# Patient Record
Sex: MALE | Race: WHITE | Employment: OTHER | URBAN - METROPOLITAN AREA
[De-identification: names, ages, dates, MRNs, and addresses within clinical notes are randomized per-mention and may not be internally consistent; named-entity substitution may affect disease eponyms.]

---

## 2019-07-24 ENCOUNTER — APPOINTMENT (OUTPATIENT)
Dept: RADIOLOGY | Facility: CLINIC | Age: 64
End: 2019-07-24
Payer: COMMERCIAL

## 2019-07-24 ENCOUNTER — OFFICE VISIT (OUTPATIENT)
Dept: URGENT CARE | Facility: CLINIC | Age: 64
End: 2019-07-24
Payer: COMMERCIAL

## 2019-07-24 VITALS
BODY MASS INDEX: 20.99 KG/M2 | HEIGHT: 72 IN | WEIGHT: 155 LBS | HEART RATE: 62 BPM | OXYGEN SATURATION: 100 % | RESPIRATION RATE: 16 BRPM | SYSTOLIC BLOOD PRESSURE: 159 MMHG | DIASTOLIC BLOOD PRESSURE: 79 MMHG | TEMPERATURE: 97 F

## 2019-07-24 DIAGNOSIS — L08.9: ICD-10-CM

## 2019-07-24 DIAGNOSIS — L08.9: Primary | ICD-10-CM
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
## 2019-07-24 DIAGNOSIS — S90.464A: Primary | ICD-10-CM
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
## 2019-07-24 DIAGNOSIS — S90.464A: ICD-10-CM

## 2019-07-24 PROCEDURE — G0382 LEV 3 HOSP TYPE B ED VISIT: HCPCS | Performed by: PHYSICIAN ASSISTANT

## 2019-07-24 PROCEDURE — 73660 X-RAY EXAM OF TOE(S): CPT

## 2019-07-24 RX ORDER — METHYLPREDNISOLONE 4 MG/1
TABLET ORAL
Qty: 21 TABLET | Refills: 0 | Status: SHIPPED | OUTPATIENT
Start: 2019-07-24 | End: 2020-01-09 | Stop reason: ALTCHOICE

## 2019-07-24 RX ORDER — SULFAMETHOXAZOLE AND TRIMETHOPRIM 800; 160 MG/1; MG/1
1 TABLET ORAL EVERY 12 HOURS SCHEDULED
Qty: 14 TABLET | Refills: 0 | Status: SHIPPED | OUTPATIENT
Start: 2019-07-24 | End: 2019-07-31

## 2019-07-24 NOTE — PROGRESS NOTES
[address] [facility]    NAME: Georgia Og is a 61 y o  male  : 1955    MRN: [id number]  DATE: 2019  TIME: 6:39 PM    Assessment and Plan   Nonvenomous insect bite of toe with infection, right, initial encounter [S90 464A, L08 9, W57  XXXA]  1  Nonvenomous insect bite of toe with infection, right, initial encounter  XR toe right second min 2 views    sulfamethoxazole-trimethoprim (BACTRIM DS) 800-160 mg per tablet    methylprednisolone (MEDROL) 4 mg tablet       Patient Instructions     Patient Instructions   Keep wound clean and dry  Change dressing daily or any time that it is wet  Start antibiotic and Medrol Dosepak  Take as directed  If symptoms are worsening, you get a fever or redness is spreading, go to the emergency room  Follow up with family doctor in the next week to ensure proper healing  Chief Complaint     Chief Complaint   Patient presents with    Wound Infection     righr 2nd toe x 3 days       History of Present Illness   68-year-old male here with complaint of insect bite to his right 2nd toe  Patient states that he was walking through a field of [place] and got stung by a bee a few days ago  Area is getting more reddened and swollen  Patient states that he was wearing his boots to work find a blister developed over the swollen area  Blister has now opened and there is more redness around the area  He denies any fever or chills  Review of Systems   Review of Systems   Constitutional: Negative for chills and fever  Respiratory: Negative for cough and shortness of breath  Cardiovascular: Negative for chest pain  Skin: Positive for wound  Neurological: Negative for weakness and numbness  All other systems reviewed and are negative        Current Medications     Current Outpatient Medications:     methylprednisolone (MEDROL) 4 mg tablet, Medrol dosepak, take as directed, Disp: 21 tablet, Rfl: 0    sulfamethoxazole-trimethoprim (BACTRIM DS) 800-160 mg per tablet, Take 1 tablet by mouth every 12 (twelve) hours for 7 days, Disp: 14 tablet, Rfl: 0    Current Allergies     Allergies as of 07/24/2019 - Reviewed 07/24/2019   Allergen Reaction Noted    Penicillins Rash 11/30/2011          The following portions of the patient's history were reviewed and updated as appropriate: allergies, current medications, past family history, past medical history, past social history, past surgical history and problem list    History reviewed  No pertinent past medical history    Past Surgical History:   Procedure Laterality Date    EYE SURGERY       Family History   Problem Relation Age of Onset    Heart disease Mother     Cancer Mother     Colon cancer Paternal Grandfather     Diabetes Family      Social History     Socioeconomic History    Marital status: /Civil Union     Spouse name: Not on file    Number of children: Not on file    Years of education: Not on file    Highest education level: Not on file   Occupational History    Not on file   Social Needs    Financial resource strain: Not on file    Food insecurity:     Worry: Not on file     Inability: Not on file    Transportation needs:     Medical: Not on file     Non-medical: Not on file   Tobacco Use    Smoking status: Never Smoker   Substance and Sexual Activity    Alcohol use: Not on file    Drug use: Not on file    Sexual activity: Not on file   Lifestyle    Physical activity:     Days per week: Not on file     Minutes per session: Not on file    Stress: Not on file   Relationships    Social connections:     Talks on phone: Not on file     Gets together: Not on file     Attends Voodoo service: Not on file     Active member of club or organization: Not on file     Attends meetings of clubs or organizations: Not on file     Relationship status: Not on file    Intimate partner violence:     Fear of current or ex partner: Not on file     Emotionally abused: Not on file     Physically abused: Not on file     Forced sexual activity: Not on file   Other Topics Concern    Not on file   Social History Narrative    Denied: Alcohol use (history) - As per [employer]    Caffeine use    Uses safety equipment: Seatbelts      Medications have been verified  Objective   /79   Pulse 62   Temp (!) 97 °F (36 1 °C)   Resp 16   Ht 6' (1 829 m)   Wt 70 3 kg (155 lb)   SpO2 100%   BMI 21 02 kg/m²      Physical Exam   Physical Exam   Constitutional: He appears well-developed and well-nourished  No distress  HENT:   Head: Normocephalic and atraumatic  Cardiovascular: Normal rate and regular rhythm  No murmur heard  Pulmonary/Chest: Effort normal and breath sounds normal  No respiratory distress  Musculoskeletal:        Left foot: There is tenderness and swelling  There is normal range of motion and normal capillary refill (Cap refill less than 2 seconds  Sensation intact to light touch )  Feet:    Nursing note and vitals reviewed

## 2019-07-24 NOTE — PATIENT INSTRUCTIONS
Keep wound clean and dry  Change dressing daily or any time that it is wet  Start antibiotic and Medrol Dosepak  Take as directed  If symptoms are worsening, you get a fever or redness is spreading, go to the emergency room  Follow up with family doctor in the next week to ensure proper healing

## 2020-01-09 ENCOUNTER — OFFICE VISIT (OUTPATIENT)
Dept: URGENT CARE | Facility: CLINIC | Age: 65
End: 2020-01-09
Payer: COMMERCIAL

## 2020-01-09 VITALS
BODY MASS INDEX: 20.99 KG/M2 | HEIGHT: 72 IN | TEMPERATURE: 98.5 F | WEIGHT: 155 LBS | HEART RATE: 86 BPM | RESPIRATION RATE: 18 BRPM | SYSTOLIC BLOOD PRESSURE: 125 MMHG | OXYGEN SATURATION: 98 % | DIASTOLIC BLOOD PRESSURE: 79 MMHG

## 2020-01-09 DIAGNOSIS — J01.00 ACUTE NON-RECURRENT MAXILLARY SINUSITIS: Primary | ICD-10-CM

## 2020-01-09 PROCEDURE — 99213 OFFICE O/P EST LOW 20 MIN: CPT | Performed by: NURSE PRACTITIONER

## 2020-01-09 RX ORDER — DOXYCYCLINE 100 MG/1
100 TABLET ORAL 2 TIMES DAILY
Qty: 14 TABLET | Refills: 0 | Status: SHIPPED | OUTPATIENT
Start: 2020-01-09 | End: 2020-01-16

## 2020-01-09 RX ORDER — BENZONATATE 100 MG/1
100 CAPSULE ORAL 3 TIMES DAILY PRN
Qty: 20 CAPSULE | Refills: 0 | Status: SHIPPED | OUTPATIENT
Start: 2020-01-09 | End: 2020-03-07 | Stop reason: ALTCHOICE

## 2020-01-09 NOTE — PATIENT INSTRUCTIONS
Rest and drink extra fluids  Start antibiotic  Take probiotic  Flonase and allergy medicine can also be helpful  Over-the-counter cough and cold medicine as needed  Tylenol Motrin as needed for pain or fever  Follow up with PCP if no improvement  Go to the ER with any worsening symptoms, chest pain, shortness of breath or difficulty breathing

## 2020-01-09 NOTE — PROGRESS NOTES
[address] [facility]        NAME: Leighann Albarran is a 59 y o  male  : 1955    MRN: [id number]  DATE: 2020  TIME: 3:32 PM    Assessment and Plan   Acute non-recurrent maxillary sinusitis [J01 00]  1  Acute non-recurrent maxillary sinusitis  doxycycline (ADOXA) 100 MG tablet    benzonatate (TESSALON PERLES) 100 mg capsule         Patient Instructions     Patient Instructions   Rest and drink extra fluids  Start antibiotic  Take probiotic  Flonase and allergy medicine can also be helpful  Over-the-counter cough and cold medicine as needed  Tylenol Motrin as needed for pain or fever  Follow up with PCP if no improvement  Go to the ER with any worsening symptoms, chest pain, shortness of breath or difficulty breathing  Chief Complaint     Chief Complaint   Patient presents with    Cough     x 5 days    Cold Like Symptoms         History of Present Illness   Irena Wilder presents to the clinic c/o    This is a 58-year-old male here today with complaints of sinus pressure, nasal congestion and cough  He states symptoms started about 5-6 days ago  He states symptoms are getting worse  He denies any chest pain or shortness of breath  He does have some pain with coughing  He states he does not had fevers but has been having body aches and chills  Denies sore throat  He states he is having a lot of nasal drainage  He is having a lot of postnasal drip  He denies having influenza vaccine  Review of Systems   Review of Systems   Constitutional: Positive for activity change and fatigue  Negative for chills and fever  HENT: Positive for congestion, rhinorrhea, sinus pressure and sinus pain  Negative for sore throat  Respiratory: Positive for cough  Negative for chest tightness, shortness of breath and wheezing  Skin: Negative  Neurological: Negative  Psychiatric/Behavioral: Negative  Current Medications     No long-term medications on file         Current Allergies     Allergies as of 01/09/2020 - Reviewed 01/09/2020   Allergen Reaction Noted    Penicillins Rash 11/30/2011            The following portions of the patient's history were reviewed and updated as appropriate: allergies, current medications, past family history, past medical history, past social history, past surgical history and problem list     Objective   /79   Pulse 86   Temp 98 5 °F (36 9 °C)   Resp 18   Ht 6' (1 829 m)   Wt 70 3 kg (155 lb)   SpO2 98%   BMI 21 02 kg/m²        Physical Exam     Physical Exam   Constitutional: He is oriented to person, place, and time  HENT:   Right Ear: External ear normal    Left Ear: External ear normal    Sinus pressure   Neck: Normal range of motion  Neck supple  Cardiovascular: Normal rate and regular rhythm  Pulmonary/Chest: Effort normal and breath sounds normal    Neurological: He is alert and oriented to person, place, and time  Psychiatric: He has a normal mood and affect  His behavior is normal    Nursing note and vitals reviewed    The

## 2020-03-07 ENCOUNTER — OFFICE VISIT (OUTPATIENT)
Dept: URGENT CARE | Facility: CLINIC | Age: 65
End: 2020-03-07
Payer: COMMERCIAL

## 2020-03-07 VITALS
RESPIRATION RATE: 16 BRPM | BODY MASS INDEX: 20.99 KG/M2 | HEIGHT: 72 IN | WEIGHT: 155 LBS | OXYGEN SATURATION: 98 % | HEART RATE: 85 BPM | DIASTOLIC BLOOD PRESSURE: 86 MMHG | TEMPERATURE: 98 F | SYSTOLIC BLOOD PRESSURE: 146 MMHG

## 2020-03-07 DIAGNOSIS — S01.01XA LACERATION OF SCALP, INITIAL ENCOUNTER: Primary | ICD-10-CM

## 2020-03-07 PROCEDURE — G0382 LEV 3 HOSP TYPE B ED VISIT: HCPCS | Performed by: PHYSICIAN ASSISTANT

## 2020-03-07 NOTE — PATIENT INSTRUCTIONS
Hydration and rest  Tylenol and motrin for pain  Keep wound clean and dry  Follow up in 5-7 days for suture removal  Go to ER if HA, dizziness, vomiting, vision changes, weakness or numbness occurs  Staple Care   WHAT YOU NEED TO KNOW:   Staples are often used to close a wound  Your staples may be placed for 3 to 14 days, depending on the location of your wound  DISCHARGE INSTRUCTIONS:   Care for your wound:   · Clean:      ¨ You may be able to shower in 24 hours  Do not soak your wound under water  ¨ Gently wash your wound with soap and warm water daily  Lightly pat it dry  Do not cover your wound unless your healthcare provider tells you to  ¨ You may also need to clean your wound with a mixture of hydrogen peroxide and water  Ask how to do this  ¨ Do not apply ointment or cream to the wound unless your healthcare provider tells you to  · Elevate:      ¨ Rest any arm or leg that has a wound on pillows above the level of your heart  Do this as often as possible for 2 days  This will help decrease swelling and pain, and help you heal faster  · Minimize scarring:      ¨ Avoid sunshine on your wound to reduce scarring  Follow up with your healthcare provider as directed: You may need to return for a wound checkup 3 days after your staples are placed  Ask when you should return to get your staples removed  Staple removal:   · A medical staple remover  will be used to take out your staples  Your healthcare provider will slide the tool under each staple, squeeze the handle, and gently pull the staple out  · Medical tape  will be placed on your wound once your staples are removed  This will help keep your wound closed  The medical tape will fall off on its own after several days  Contact your healthcare provider if:   · You have redness, pain, swelling, and pus draining from your wound  · Your pain medicine does not relieve your pain      · You have a fever of 101°F (38 5°C) or higher  · You have an odor coming from your wound  · You have questions or concerns about your condition or care  Return to the emergency department if:   · Your wound reopens  · You have red streaks in your skin that spread out from your wound  · You have severe pain or vomiting  © 2017 [address] [employer] Information is for End User's use only and may not be sold, redistributed or otherwise used for commercial purposes  All illustrations and images included in CareNotes® are the copyrighted property of A D A M , [employer]  or [employer]  The above information is an  only  It is not intended as medical advice for individual conditions or treatments  Talk to your doctor, nurse or pharmacist before following any medical regimen to see if it is safe and effective for you

## 2020-03-07 NOTE — PROGRESS NOTES
[facility]        NAME: Asia Fitch is a 59 y o  male  : 1955    MRN: [id number]  DATE: 2020  TIME: 5:19 PM    Assessment and Plan   Laceration of scalp, initial encounter [S01 01XA]  1  Laceration of scalp, initial encounter           Patient Instructions     Patient Instructions   Hydration and rest  Tylenol and motrin for pain  Keep wound clean and dry  Follow up in 5-7 days for suture removal  Go to ER if HA, dizziness, vomiting, vision changes, weakness or numbness occurs  Staple Care   WHAT YOU NEED TO KNOW:   Staples are often used to close a wound  Your staples may be placed for 3 to 14 days, depending on the location of your wound  DISCHARGE INSTRUCTIONS:   Care for your wound:   · Clean:      ¨ You may be able to shower in 24 hours  Do not soak your wound under water  ¨ Gently wash your wound with soap and warm water daily  Lightly pat it dry  Do not cover your wound unless your healthcare provider tells you to  ¨ You may also need to clean your wound with a mixture of hydrogen peroxide and water  Ask how to do this  ¨ Do not apply ointment or cream to the wound unless your healthcare provider tells you to  · Elevate:      ¨ Rest any arm or leg that has a wound on pillows above the level of your heart  Do this as often as possible for 2 days  This will help decrease swelling and pain, and help you heal faster  · Minimize scarring:      ¨ Avoid sunshine on your wound to reduce scarring  Follow up with your healthcare provider as directed: You may need to return for a wound checkup 3 days after your staples are placed  Ask when you should return to get your staples removed  Staple removal:   · A medical staple remover  will be used to take out your staples  Your healthcare provider will slide the tool under each staple, squeeze the handle, and gently pull the staple out  · Medical tape  will be placed on your wound once your staples are removed  Musculoskeletal: Negative for gait problem, neck pain and neck stiffness  Skin: Positive for wound  Neurological: Negative for dizziness, syncope, speech difficulty, weakness, light-headedness, numbness and headaches  Current Medications     No current outpatient medications on file  Current Allergies     Allergies as of 03/07/2020 - Reviewed 03/07/2020   Allergen Reaction Noted    Penicillins Rash 11/30/2011            The following portions of the patient's history were reviewed and updated as appropriate: allergies, current medications, past family history, past medical history, past social history, past surgical history and problem list      History reviewed  No pertinent past medical history  Past Surgical History:   Procedure Laterality Date    EYE SURGERY         Family History   Problem Relation Age of Onset    Heart disease Mother     Cancer Mother     Colon cancer Paternal Grandfather     Diabetes Family          Medications have been verified  Objective   /86   Pulse 85   Temp 98 °F (36 7 °C)   Resp 16   Ht 6' (1 829 m)   Wt 70 3 kg (155 lb)   SpO2 98%   BMI 21 02 kg/m²          Physical Exam     Physical Exam   Constitutional: He is oriented to person, place, and time  He appears well-developed and well-nourished  No distress  HENT:   Head: Normocephalic  Eyes: Pupils are equal, round, and reactive to light  Conjunctivae and EOM are normal    Neck: Normal range of motion  Neck supple  Cardiovascular: Normal rate, regular rhythm and intact distal pulses  Pulmonary/Chest: Effort normal and breath sounds normal    Neurological: He is alert and oriented to person, place, and time  No cranial nerve deficit or sensory deficit  Coordination normal    Skin: Skin is warm and dry  2 cm linear laceration superior mid scalp  No foreign body, erythema, edema, discharge  No bone or muscle injury  Vitals reviewed        Laceration repair  Date/Time: 3/7/2020 5:18 PM  Performed by: Abdulkadir Mcmahan PA-C  Authorized by: Abdulkadir Mcmahan PA-C   Consent: Verbal consent obtained  Written consent obtained    Body area: head/neck  Location details: scalp  Laceration length: 2 cm  Foreign bodies: no foreign bodies  Tendon involvement: none  Nerve involvement: none      Procedure Details:  Irrigation solution: saline  Irrigation method: syringe  Amount of cleaning: standard  Skin closure: staples  Number of sutures: 4  Approximation: close  Approximation difficulty: simple  Dressing: antibiotic ointment and 4x4 sterile gauze

## 2021-09-14 ENCOUNTER — APPOINTMENT (OUTPATIENT)
Dept: RADIOLOGY | Facility: CLINIC | Age: 66
End: 2021-09-14
Payer: COMMERCIAL

## 2021-09-14 ENCOUNTER — OFFICE VISIT (OUTPATIENT)
Dept: URGENT CARE | Facility: CLINIC | Age: 66
End: 2021-09-14
Payer: COMMERCIAL

## 2021-09-14 VITALS
DIASTOLIC BLOOD PRESSURE: 72 MMHG | HEART RATE: 70 BPM | RESPIRATION RATE: 18 BRPM | TEMPERATURE: 98.1 F | OXYGEN SATURATION: 97 % | SYSTOLIC BLOOD PRESSURE: 161 MMHG

## 2021-09-14 DIAGNOSIS — M79.671 RIGHT FOOT PAIN: ICD-10-CM

## 2021-09-14 DIAGNOSIS — S92.351A CLOSED DISPLACED FRACTURE OF FIFTH METATARSAL BONE OF RIGHT FOOT, INITIAL ENCOUNTER: Primary | ICD-10-CM

## 2021-09-14 PROCEDURE — 99213 OFFICE O/P EST LOW 20 MIN: CPT | Performed by: PHYSICIAN ASSISTANT

## 2021-09-14 PROCEDURE — 73630 X-RAY EXAM OF FOOT: CPT

## 2021-09-14 PROCEDURE — 29515 APPLICATION SHORT LEG SPLINT: CPT | Performed by: PHYSICIAN ASSISTANT

## 2021-09-14 NOTE — PROGRESS NOTES
[address] [facility]          NAME: Selam Molina is a 77 y o  male  : 1955    MRN: [id number]  DATE: 2021  TIME: 7:00 PM    Assessment and Plan   Closed displaced fracture of fifth metatarsal bone of right foot, initial encounter [S92 351A]  1  Closed displaced fracture of fifth metatarsal bone of right foot, initial encounter  XR foot 3+ vw right    Crutches    Ambulatory referral to Orthopedic Surgery       Patient Instructions   Fracture of 5th metatarsal  Short leg splint applied and splint instructions given  Crutches and crutch training provided  To follow up with Ortho as scheduled at todays apt  OTC IBU Q 4-6 hours prn with food for pain  Ice and elevate as able  Patient agreeable to plan  To present to the ER if symptoms worsen  Chief Complaint     Chief Complaint   Patient presents with    Foot Pain     Right foot pain after twisting it  History of Present Illness   Rowena Wilder presents to the clinic c/o    Leg Pain   The incident occurred 3 to 5 days ago  The incident occurred at home  The injury mechanism was a direct blow (dropped something on right foot (unsure exactly what it was) also fell one day and twisted his foot in mulitple directions per patient)  The pain is present in the right foot  The quality of the pain is described as aching  The pain is moderate  The pain has been constant since onset  Associated symptoms include numbness  Pertinent negatives include no inability to bear weight, loss of motion, loss of sensation or muscle weakness  The symptoms are aggravated by movement, palpation and weight bearing  He has tried nothing for the symptoms  The treatment provided no relief  Review of Systems   Review of Systems   Constitutional: Negative for chills, diaphoresis, fatigue and fever  HENT: Negative for congestion, ear discharge, ear pain and facial swelling      Eyes: Negative for photophobia, pain, discharge, redness, itching and visual disturbance  Respiratory: Negative for apnea, cough, chest tightness, shortness of breath and wheezing  Cardiovascular: Negative for chest pain and palpitations  Gastrointestinal: Negative for abdominal pain  Musculoskeletal: Positive for arthralgias  Skin: Negative for color change, rash and wound  Neurological: Positive for numbness  Negative for dizziness and headaches  Hematological: Negative for adenopathy  Current Medications     No long-term medications on file  Current Allergies     Allergies as of 09/14/2021 - Reviewed 09/14/2021   Allergen Reaction Noted    Penicillins Rash 11/30/2011            The following portions of the patient's history were reviewed and updated as appropriate: allergies, current medications, past family history, past medical history, past social history, past surgical history and problem list   No past medical history on file  Past Surgical History:   Procedure Laterality Date    EYE SURGERY       Social History     Socioeconomic History    Marital status: /Civil Union     Spouse name: Not on file    Number of children: Not on file    Years of education: Not on file    Highest education level: Not on file   Occupational History    Not on file   Tobacco Use    Smoking status: Never Smoker   Substance and Sexual Activity    Alcohol use: Not on file    Drug use: Not on file    Sexual activity: Not on file   Other Topics Concern    Not on file   Social History Narrative    Denied: Alcohol use (history) - As per [employer]    Caffeine use    Uses safety equipment: Seatbelts      Social Determinants of Health     Financial Resource Strain:     Difficulty of Paying Living Expenses:    Food Insecurity:     Worried About Running Out of Food in the Last Year:     [address] in the Last Year:    Transportation Needs:     Lack of Transportation (Medical):      Lack of Transportation (Non-Medical):    Physical Activity:     Days of Exercise per Week:     Minutes of Exercise per Session:    Stress:     Feeling of Stress :    Social Connections:     Frequency of Communication with Friends and Family:     Frequency of Social Gatherings with Friends and Family:     Attends Islam Services:     Active Member of Clubs or Organizations:     Attends Club or Organization Meetings:     Marital Status:    Intimate Partner Violence:     Fear of Current or Ex-Partner:     Emotionally Abused:     Physically Abused:     Sexually Abused:        Objective   /72   Pulse 70   Temp 98 1 °F (36 7 °C)   Resp 18   SpO2 97%      Physical Exam     Physical Exam  Vitals and nursing note reviewed  Constitutional:       General: He is not in acute distress  Appearance: He is well-developed  He is not diaphoretic  HENT:      Head: Normocephalic and atraumatic  Right Ear: External ear normal       Left Ear: External ear normal       Nose: Nose normal    Eyes:      General: No scleral icterus  Right eye: No discharge  Left eye: No discharge  Conjunctiva/sclera: Conjunctivae normal    Cardiovascular:      Rate and Rhythm: Normal rate and regular rhythm  Heart sounds: Normal heart sounds  No murmur heard  No friction rub  No gallop  Pulmonary:      Effort: Pulmonary effort is normal  No respiratory distress  Breath sounds: Normal breath sounds  No decreased breath sounds, wheezing, rhonchi or rales  Musculoskeletal:      Right ankle: Swelling present  No tenderness  Normal range of motion  Right foot: Normal capillary refill (dorsalis pedis pulse 2+)  Swelling, tenderness and bony tenderness (5th metatarsal) present  Normal pulse  Skin:     General: Skin is warm and dry  Coloration: Skin is not pale  Findings: No erythema or rash  Neurological:      Mental Status: He is alert and oriented to person, place, and time     Psychiatric:         Behavior: Behavior normal          Thought Content: Thought content normal          Judgment: Judgment normal      Splint application    Date/Time: 9/14/2021 6:58 PM  Performed by: Nilesh Blanca PA-C  Authorized by: Nilesh Blanca PA-C   Universal Protocol:  Consent: Verbal consent obtained  Risks and benefits: risks, benefits and alternatives were discussed  Consent given by: patient      Pre-procedure details:     Sensation:  Normal  Procedure details:     Laterality:  Right    Location:  Foot    Foot:  R foot    Strapping: no      Splint type:  Short leg    Supplies:  Ortho-Glass and skin protective strip  Post-procedure details:     Pain:  Improved    Sensation:  Normal    Patient tolerance of procedure:   Tolerated well, no immediate complications  Comments:      Crutch training        Nilesh Blanca PA-C

## 2021-09-16 ENCOUNTER — OFFICE VISIT (OUTPATIENT)
Dept: OBGYN CLINIC | Facility: CLINIC | Age: 66
End: 2021-09-16

## 2021-09-16 ENCOUNTER — APPOINTMENT (OUTPATIENT)
Dept: RADIOLOGY | Facility: CLINIC | Age: 66
End: 2021-09-16

## 2021-09-16 VITALS
HEIGHT: 72 IN | DIASTOLIC BLOOD PRESSURE: 71 MMHG | SYSTOLIC BLOOD PRESSURE: 173 MMHG | BODY MASS INDEX: 21.02 KG/M2 | HEART RATE: 83 BPM

## 2021-09-16 DIAGNOSIS — S99.911A ANKLE INJURY, RIGHT, INITIAL ENCOUNTER: ICD-10-CM

## 2021-09-16 DIAGNOSIS — S99.911A ANKLE INJURY, RIGHT, INITIAL ENCOUNTER: Primary | ICD-10-CM

## 2021-09-16 PROCEDURE — 99204 OFFICE O/P NEW MOD 45 MIN: CPT | Performed by: FAMILY MEDICINE

## 2021-09-16 PROCEDURE — 73610 X-RAY EXAM OF ANKLE: CPT

## 2021-09-16 NOTE — PROGRESS NOTES
[facility] [place] [id number]  [facility] [id number] [phone]      Chief Complaint:  Chief Complaint   Patient presents with    Right Foot - Pain       Vitals:  BP (!) 173/71   Pulse 83   Ht 6' (1 829 m)   BMI 21 02 kg/m²     The following portions of the patient's history were reviewed and updated as appropriate: allergies, current medications, past family history, past medical history, past social history, past surgical history, and problem list       Subjective:   Patient ID: Yamini Lugo is a 77 y o  male  Here c/o R foot pain/fracture  About 1-2 wks ago dropped something on his R foot  Saturday twisted his R foot  His foot was asleep and he tried to run across the room and twisted his R foot  Seen in UC 2 days ago  Given crutches and splint  Hurts to walk  Swollen  Sharp pain  No pain meds  RIGHT FOOT     INDICATION:   [id number]: Pain in right foot      COMPARISON:  07/24/2019     VIEWS:  XR FOOT 3+ VW RIGHT   Images: 3     FINDINGS:     There is an acute displaced oblique fracture in the mid to distal 5th metatarsal      No significant degenerative changes      No lytic or blastic osseous lesion      Soft tissue swelling is present      IMPRESSION:     Acute slightly displaced fracture in the 5th metatarsal             Review of Systems   Constitutional: Negative for fatigue and fever  Respiratory: Negative for shortness of breath  Cardiovascular: Negative for chest pain  Gastrointestinal: Negative for abdominal pain and nausea  Genitourinary: Negative for dysuria  Musculoskeletal: Positive for arthralgias and gait problem  Skin: Negative for rash and wound  Neurological: Negative for weakness and headaches  Objective:  Right Ankle Exam     Tenderness   The patient is experiencing tenderness in the ATF and lateral malleolus (5th metatarsal)    Swelling: moderate    Range of Motion   The patient has normal right ankle ROM  Other   Sensation: normal             Physical Exam  Vitals and nursing note reviewed  Constitutional:       Appearance: Normal appearance  He is well-developed  HENT:      Head: Normocephalic  Mouth/Throat:      Mouth: Mucous membranes are moist    Eyes:      Extraocular Movements: Extraocular movements intact  Cardiovascular:      Rate and Rhythm: Normal rate and regular rhythm  Heart sounds: Normal heart sounds  Pulmonary:      Effort: Pulmonary effort is normal       Breath sounds: Normal breath sounds  Abdominal:      General: Bowel sounds are normal       Palpations: Abdomen is soft  Musculoskeletal:         General: Swelling, tenderness and signs of injury present  Normal range of motion  Cervical back: Normal range of motion  Skin:     General: Skin is warm and dry  Neurological:      General: No focal deficit present  Mental Status: He is alert and oriented to person, place, and time  Psychiatric:         Mood and Affect: Mood normal          Behavior: Behavior normal          Thought Content: Thought content normal          I have personally reviewed pertinent films in PACS and my interpretation is XR- R foot- displaced fx of the 5th metatarsal shaft  XR- R ankle- no fx  Assessment/Plan:  Assessment/Plan   Diagnoses and all orders for this visit:    Ankle injury, right, initial encounter  -     XR ankle 3+ vw right; Future  -     Cam Boot        Return in about 3 weeks (around 10/7/2021) for Recheck       Mainor Sarkar MD

## 2021-09-16 NOTE — PATIENT INSTRUCTIONS
F/u 3 wks  XR 6 wks and 14 wks and 24 months if necessary  Consider surgery if having pain after 14 wks  Begin wearing boot  Boot for 6 wks  Weight bearing as tolerated  Crutches as needed  Icing/elevation/OTC pain meds as needed

## 2021-10-07 ENCOUNTER — OFFICE VISIT (OUTPATIENT)
Dept: OBGYN CLINIC | Facility: CLINIC | Age: 66
End: 2021-10-07

## 2021-10-07 VITALS
SYSTOLIC BLOOD PRESSURE: 151 MMHG | HEIGHT: 72 IN | DIASTOLIC BLOOD PRESSURE: 61 MMHG | BODY MASS INDEX: 21.02 KG/M2 | HEART RATE: 68 BPM

## 2021-10-07 DIAGNOSIS — S92.351D CLOSED DISPLACED FRACTURE OF FIFTH METATARSAL BONE OF RIGHT FOOT WITH ROUTINE HEALING, SUBSEQUENT ENCOUNTER: Primary | ICD-10-CM

## 2021-10-07 PROCEDURE — 99213 OFFICE O/P EST LOW 20 MIN: CPT | Performed by: FAMILY MEDICINE

## 2021-10-23 ENCOUNTER — OFFICE VISIT (OUTPATIENT)
Dept: URGENT CARE | Facility: CLINIC | Age: 66
End: 2021-10-23
Payer: COMMERCIAL

## 2021-10-23 ENCOUNTER — APPOINTMENT (OUTPATIENT)
Dept: RADIOLOGY | Facility: CLINIC | Age: 66
End: 2021-10-23
Payer: COMMERCIAL

## 2021-10-23 VITALS
OXYGEN SATURATION: 98 % | WEIGHT: 155 LBS | HEART RATE: 66 BPM | SYSTOLIC BLOOD PRESSURE: 144 MMHG | TEMPERATURE: 98 F | RESPIRATION RATE: 20 BRPM | BODY MASS INDEX: 20.99 KG/M2 | DIASTOLIC BLOOD PRESSURE: 68 MMHG | HEIGHT: 72 IN

## 2021-10-23 DIAGNOSIS — M79.672 LEFT FOOT PAIN: ICD-10-CM

## 2021-10-23 DIAGNOSIS — M79.622 ARMPIT PAIN, LEFT: ICD-10-CM

## 2021-10-23 DIAGNOSIS — L03.115 CELLULITIS OF RIGHT FOOT: ICD-10-CM

## 2021-10-23 DIAGNOSIS — L89.892 PRESSURE ULCER OF TOE OF RIGHT FOOT, STAGE 2 (HCC): Primary | ICD-10-CM

## 2021-10-23 PROCEDURE — 73630 X-RAY EXAM OF FOOT: CPT

## 2021-10-23 PROCEDURE — 99214 OFFICE O/P EST MOD 30 MIN: CPT | Performed by: NURSE PRACTITIONER

## 2021-10-23 RX ORDER — CEPHALEXIN 500 MG/1
500 CAPSULE ORAL 4 TIMES DAILY
Qty: 40 CAPSULE | Refills: 0 | Status: SHIPPED | OUTPATIENT
Start: 2021-10-23 | End: 2021-11-02

## 2021-11-01 ENCOUNTER — OFFICE VISIT (OUTPATIENT)
Dept: OBGYN CLINIC | Facility: CLINIC | Age: 66
End: 2021-11-01

## 2021-11-01 ENCOUNTER — APPOINTMENT (OUTPATIENT)
Dept: RADIOLOGY | Facility: CLINIC | Age: 66
End: 2021-11-01

## 2021-11-01 VITALS
BODY MASS INDEX: 22.43 KG/M2 | SYSTOLIC BLOOD PRESSURE: 156 MMHG | DIASTOLIC BLOOD PRESSURE: 82 MMHG | WEIGHT: 165.4 LBS | TEMPERATURE: 97.6 F | HEART RATE: 72 BPM

## 2021-11-01 DIAGNOSIS — S92.351D CLOSED DISPLACED FRACTURE OF FIFTH METATARSAL BONE OF RIGHT FOOT WITH ROUTINE HEALING, SUBSEQUENT ENCOUNTER: ICD-10-CM

## 2021-11-01 DIAGNOSIS — S92.351D CLOSED DISPLACED FRACTURE OF FIFTH METATARSAL BONE OF RIGHT FOOT WITH ROUTINE HEALING, SUBSEQUENT ENCOUNTER: Primary | ICD-10-CM

## 2021-11-01 PROCEDURE — 99214 OFFICE O/P EST MOD 30 MIN: CPT | Performed by: FAMILY MEDICINE

## 2021-11-01 PROCEDURE — 73630 X-RAY EXAM OF FOOT: CPT

## 2021-12-11 ENCOUNTER — TELEPHONE (OUTPATIENT)
Dept: OBGYN CLINIC | Facility: CLINIC | Age: 66
End: 2021-12-11

## 2022-01-18 ENCOUNTER — OFFICE VISIT (OUTPATIENT)
Dept: URGENT CARE | Facility: CLINIC | Age: 67
End: 2022-01-18
Payer: COMMERCIAL

## 2022-01-18 VITALS
DIASTOLIC BLOOD PRESSURE: 80 MMHG | RESPIRATION RATE: 16 BRPM | TEMPERATURE: 97.7 F | WEIGHT: 161.25 LBS | HEART RATE: 86 BPM | SYSTOLIC BLOOD PRESSURE: 130 MMHG | OXYGEN SATURATION: 97 % | BODY MASS INDEX: 21.87 KG/M2

## 2022-01-18 DIAGNOSIS — L03.90 CELLULITIS, UNSPECIFIED CELLULITIS SITE: Primary | ICD-10-CM

## 2022-01-18 DIAGNOSIS — B35.3 TINEA PEDIS OF RIGHT FOOT: ICD-10-CM

## 2022-01-18 PROCEDURE — 99213 OFFICE O/P EST LOW 20 MIN: CPT | Performed by: PHYSICIAN ASSISTANT

## 2022-01-18 RX ORDER — CEPHALEXIN 500 MG/1
500 CAPSULE ORAL EVERY 8 HOURS SCHEDULED
Qty: 30 CAPSULE | Refills: 0 | Status: SHIPPED | OUTPATIENT
Start: 2022-01-18 | End: 2022-01-28

## 2022-01-18 RX ORDER — CLOTRIMAZOLE 1 %
CREAM (GRAM) TOPICAL 2 TIMES DAILY
Qty: 30 G | Refills: 0 | Status: SHIPPED | OUTPATIENT
Start: 2022-01-18

## 2022-01-18 NOTE — PATIENT INSTRUCTIONS
Cellulitis   WHAT YOU NEED TO KNOW:   Cellulitis is a skin infection caused by bacteria  Cellulitis is common and can become severe  Cellulitis usually appears on the lower legs  It can also appear on the arms, face, and other areas  Cellulitis develops when bacteria enter a crack or break in your skin, such as a scratch, bite, or cut  DISCHARGE INSTRUCTIONS:   Return to the emergency department if:   · Your wound gets larger and more painful  · You feel a crackling under your skin when you touch it  · You have purple dots or bumps on your skin, or you see bleeding under your skin  · You see red streaks coming from the infected area  Call your doctor if:   · The red, warm, swollen area gets larger  · Your fever or pain does not go away or gets worse  · The area does not get smaller after 3 days of antibiotics  · You have questions or concerns about your condition or care  Medicines: You should start to see improvement in 3 days  If cellulitis is not treated, the infection can spread through your body and become life-threatening  You may need any of the following medicines:  · Antibiotics  help treat the bacterial infection  · Acetaminophen  decreases pain and fever  It is available without a doctor's order  Ask how much to take and how often to take it  Follow directions  Read the labels of all other medicines you are using to see if they also contain acetaminophen, or ask your doctor or pharmacist  Acetaminophen can cause liver damage if not taken correctly  Do not use more than 4 grams (4,000 milligrams) total of acetaminophen in one day  · NSAIDs , such as ibuprofen, help decrease swelling, pain, and fever  This medicine is available with or without a doctor's order  NSAIDs can cause stomach bleeding or kidney problems in certain people  If you take blood thinner medicine, always ask your healthcare provider if NSAIDs are safe for you   Always read the medicine label and follow directions  · Take your medicine as directed  Contact your healthcare provider if you think your medicine is not helping or if you have side effects  Tell him or her if you are allergic to any medicine  Keep a list of the medicines, vitamins, and herbs you take  Include the amounts, and when and why you take them  Bring the list or the pill bottles to follow-up visits  Carry your medicine list with you in case of an emergency  Self-care:   · Wash the area with soap and water every day  Gently pat dry  Use bandages if directed by your healthcare provider  · Elevate the area above the level of your heart  as often as you can  This will help decrease swelling and pain  Prop the area on pillows or blankets to keep it elevated comfortably  · Place a cool, damp cloth on the area  Use clean cloths and clean water  You can do this as often as you need to  Cool, damp cloths may help decrease pain  · Apply cream or ointment as directed  These help protect the area  Most over-the-counter products, such as petroleum jelly, are good to use  Ask your healthcare provider about specific creams or ointments you should use  Prevent cellulitis:   · Do not scratch bug bites or areas of injury  You increase your risk for cellulitis by scratching these areas  · Do not share personal items, such as towels, clothing, and razors  · Clean exercise equipment  with germ-killing  before and after you use it  · Treat athlete's foot  This can help prevent the spread of a bacterial skin infection  · Wash your hands often  Use soap and water  Wash your hands after you use the bathroom, change a child's diapers, or sneeze  Wash your hands before you prepare or eat food  Use lotion to prevent dry, cracked skin  Follow up with your doctor within 3 days, or as directed:  He or she will check if your cellulitis is getting better   Write down your questions so you remember to ask them during your visits  © Copyright [employer] 2021 Information is for End User's use only and may not be sold, redistributed or otherwise used for commercial purposes  All illustrations and images included in CareNotes® are the copyrighted property of [employer]  or Barbara Argueta  The above information is an  only  It is not intended as medical advice for individual conditions or treatments  Talk to your doctor, nurse or pharmacist before following any medical regimen to see if it is safe and effective for you  Tinea Pedis   WHAT YOU NEED TO KNOW:   Tinea pedis, or athlete's foot, is a foot infection caused by a fungus  DISCHARGE INSTRUCTIONS:   Medicines:   · Antifungal medicine: This medicine may be given as a cream, gel, or pill  You may need a doctor's order for this medicine  Take it until it is gone, even if your feet look like they are healed  · Take your medicine as directed  Call your healthcare provider if you think your medicine is not helping or if you have side effects  Tell him if you are allergic to any medicine  Keep a list of the medicines, vitamins, and herbs you take  Include the amounts, and when and why you take them  Bring the list or the pill bottles to follow-up visits  Carry your medicine list with you in case of an emergency  Follow up with your healthcare provider as directed:  Write down your questions so you remember to ask them during your visits  Prevent the spread of tinea pedis:   · Keep your feet clean and dry:  Wash your feet daily and dry your feet well, especially between your toes  After your feet are dry, use powder on your feet and between your toes  Wear clean cotton or wool socks each day  Put your socks on first so you do not spread the infection to other areas of your body  Wear sandals, canvas tennis shoes, or other shoes that allow air to flow to your feet  This helps keep your feet dry  Avoid plastic or rubber shoes       · Soak your feet: If you have blisters, soak your feet in an astringent (drying) solution  Do this for 20 to 30 minutes, 2 times each day to help dry out the blisters  An astringent solution may be bought at drug or grocery stores  · Wear shoes in public areas:  Do not walk barefoot in public places  Wear shower shoes or sandals in warm, damp areas  This includes shower stalls, near swimming pools, and locker rooms  Do not share socks or shoes  Contact your healthcare provider if:   · Your infection spreads or you have a rash on other parts of your body  · Your infection is not better in 14 days or completely gone in 90 days  · The skin on your foot or leg is red and hot  · You have an upset stomach or are dizzy  · You have questions or concerns about your condition or care  Seek care immediately if:   · You have a fever or chills  · You have red streaks going up your leg  © 2016 [address] is for End User's use only and may not be sold, redistributed or otherwise used for commercial purposes  All illustrations and images included in CareNotes® are the copyrighted property of [employer]  or [employer]  The above information is an  only  It is not intended as medical advice for individual conditions or treatments  Talk to your doctor, nurse or pharmacist before following any medical regimen to see if it is safe and effective for you

## 2022-01-18 NOTE — PROGRESS NOTES
[facility]        NAME: Sonal Gerardo is a 77 y o  male  : 1955    MRN: [id number]  DATE: 2022  TIME: 10:33 AM    /80   Pulse 86   Temp 97 7 °F (36 5 °C)   Resp 16   Wt 73 1 kg (161 lb 4 oz)   SpO2 97%   BMI 21 87 kg/m²     Assessment and Plan   Cellulitis, unspecified cellulitis site [L03 90]  1  Cellulitis, unspecified cellulitis site  clotrimazole (LOTRIMIN) 1 % cream    cephalexin (KEFLEX) 500 mg capsule   2  Tinea pedis of right foot           Patient Instructions       Follow up with PCP in 3-5 days  Proceed to  ER if symptoms worsen  Chief Complaint     Chief Complaint   Patient presents with    Foot Problem     possible infection, right foot between 4th and 5th toe  Noted some discoloration and pain last night  Itching x 2 days  Denies fever  History of Present Illness       Pt with several days of right foot itching and painful rash and swelling to 4ths and 5th toes, no known recent trauma , pt with known right foot fx      Review of Systems   Review of Systems   Constitutional: Negative  HENT: Negative  Eyes: Negative  Respiratory: Negative  Cardiovascular: Negative  Gastrointestinal: Negative  Endocrine: Negative  Genitourinary: Negative  Musculoskeletal: Negative  Skin: Negative  Allergic/Immunologic: Negative  Neurological: Negative  Hematological: Negative  Psychiatric/Behavioral: Negative  All other systems reviewed and are negative          Current Medications       Current Outpatient Medications:     cephalexin (KEFLEX) 500 mg capsule, Take 1 capsule (500 mg total) by mouth every 8 (eight) hours for 10 days, Disp: 30 capsule, Rfl: 0    clotrimazole (LOTRIMIN) 1 % cream, Apply topically 2 (two) times a day, Disp: 30 g, Rfl: 0    Current Allergies     Allergies as of 2022 - Reviewed 2022   Allergen Reaction Noted    Penicillins Rash 2011            The following portions of the patient's history were reviewed and updated as appropriate: allergies, current medications, past family history, past medical history, past social history, past surgical history and problem list      History reviewed  No pertinent past medical history  Past Surgical History:   Procedure Laterality Date    EYE SURGERY         Family History   Problem Relation Age of Onset    Heart disease Mother     Cancer Mother     Colon cancer Paternal Grandfather     Diabetes Family          Medications have been verified  Objective   /80   Pulse 86   Temp 97 7 °F (36 5 °C)   Resp 16   Wt 73 1 kg (161 lb 4 oz)   SpO2 97%   BMI 21 87 kg/m²        Physical Exam     Physical Exam  Vitals and nursing note reviewed  Constitutional:       Appearance: Normal appearance  He is normal weight  HENT:      Head: Normocephalic and atraumatic  Cardiovascular:      Rate and Rhythm: Normal rate and regular rhythm  Pulses: Normal pulses  Heart sounds: Normal heart sounds  Pulmonary:      Effort: Pulmonary effort is normal       Breath sounds: Normal breath sounds  Abdominal:      General: Abdomen is flat  Bowel sounds are normal       Palpations: Abdomen is soft  Musculoskeletal:      Cervical back: Normal range of motion and neck supple  Comments: Right 4th and 5th toes slight erythema and tender  Web space with tinea pedis ,  Toes with slight erythema    Skin:     General: Skin is warm  Capillary Refill: Capillary refill takes less than 2 seconds  Neurological:      General: No focal deficit present  Mental Status: He is alert and oriented to person, place, and time  Mental status is at baseline     Psychiatric:         Mood and Affect: Mood normal          Behavior: Behavior normal

## 2023-09-26 ENCOUNTER — OFFICE VISIT (OUTPATIENT)
Dept: URGENT CARE | Facility: CLINIC | Age: 68
End: 2023-09-26
Payer: MEDICARE

## 2023-09-26 ENCOUNTER — APPOINTMENT (OUTPATIENT)
Dept: RADIOLOGY | Facility: CLINIC | Age: 68
End: 2023-09-26
Payer: MEDICARE

## 2023-09-26 VITALS
DIASTOLIC BLOOD PRESSURE: 74 MMHG | HEIGHT: 72 IN | RESPIRATION RATE: 18 BRPM | SYSTOLIC BLOOD PRESSURE: 150 MMHG | HEART RATE: 74 BPM | WEIGHT: 163 LBS | TEMPERATURE: 97.8 F | BODY MASS INDEX: 22.08 KG/M2 | OXYGEN SATURATION: 98 %

## 2023-09-26 DIAGNOSIS — M79.672 PAIN OF LEFT HEEL: ICD-10-CM

## 2023-09-26 DIAGNOSIS — J06.9 VIRAL URI: Primary | ICD-10-CM

## 2023-09-26 LAB
SARS-COV-2 AG UPPER RESP QL IA: NEGATIVE
VALID CONTROL: NORMAL

## 2023-09-26 PROCEDURE — 73650 X-RAY EXAM OF HEEL: CPT

## 2023-09-26 PROCEDURE — 99213 OFFICE O/P EST LOW 20 MIN: CPT | Performed by: PHYSICIAN ASSISTANT

## 2023-09-26 PROCEDURE — 87811 SARS-COV-2 COVID19 W/OPTIC: CPT | Performed by: PHYSICIAN ASSISTANT

## 2023-09-26 PROCEDURE — G0463 HOSPITAL OUTPT CLINIC VISIT: HCPCS | Performed by: PHYSICIAN ASSISTANT

## 2023-09-26 NOTE — PATIENT INSTRUCTIONS
Infection appears viral.  Recommend symptomatic treatment. Can take ibuprofen or tylenol as needed for pain or fever. Over the counter cough and cold medications to help with symptoms. Use salt water gargles for sore throat and throat lozenges. Cough drops as needed. Wash hands frequently to prevent the spread of infection. If not improving over the next 7-10 days, follow up with PCP. Symptoms may persist for 10-14 days.     Follow up with podiatry for heel pain

## 2023-09-26 NOTE — PROGRESS NOTES
[facility]    NAME: Shantell Galloway is a 76 y.o. male  : 1955    MRN: [id number]  DATE: 2023  TIME: 4:47 PM    Assessment and Plan   Viral URI [J06.9]  1. Viral URI  Poct Covid 19 Rapid Antigen Test      2. Pain of left heel  XR heel / calcaneus 2+ vw left    Ambulatory Referral to Podiatry          Patient Instructions   Patient Instructions   Infection appears viral.  Recommend symptomatic treatment. Can take ibuprofen or tylenol as needed for pain or fever. Over the counter cough and cold medications to help with symptoms. Use salt water gargles for sore throat and throat lozenges. Cough drops as needed. Wash hands frequently to prevent the spread of infection. If not improving over the next 7-10 days, follow up with PCP. Symptoms may persist for 10-14 days. Follow up with podiatry for heel pain        Chief Complaint     Chief Complaint   Patient presents with   • Sore Throat     Worse at night and morning coughing. Started 4-5 days ago. History of Present Illness   76 male with complaint of cold symptoms, rhinorrhea, nasal congestion and sore throat for the last 4 days. No fever or chills. No body aches. He is also reporting left heel pain. He is unsure if he stepped on something and there is something in his heel. He does not notice any redness or any sores. No swelling. No injury. Review of Systems   Review of Systems   Constitutional: Positive for fatigue. Negative for chills and fever. HENT: Positive for congestion, rhinorrhea and sore throat. Negative for ear pain, postnasal drip and sinus pressure. Respiratory: Negative for cough, shortness of breath and wheezing. Neurological: Negative for headaches. All other systems reviewed and are negative.       Current Medications     Current Outpatient Medications:   •  clotrimazole (LOTRIMIN) 1 % cream, Apply topically 2 (two) times a day (Patient not taking: Reported on 2023), Disp: 30 g, Rfl: 0    Current Allergies     Allergies as of 09/26/2023 - Reviewed 09/26/2023   Allergen Reaction Noted   • Penicillins Rash 11/30/2011          The following portions of the patient's history were reviewed and updated as appropriate: allergies, current medications, past family history, past medical history, past social history, past surgical history and problem list.   No past medical history on file. Past Surgical History:   Procedure Laterality Date   • EYE SURGERY       Family History   Problem Relation Age of Onset   • Heart disease Mother    • Cancer Mother    • Colon cancer Paternal Grandfather    • Diabetes Family      Social History     Socioeconomic History   • Marital status: /Civil Union     Spouse name: Not on file   • Number of children: Not on file   • Years of education: Not on file   • Highest education level: Not on file   Occupational History   • Not on file   Tobacco Use   • Smoking status: Never   • Smokeless tobacco: Never   Vaping Use   • Vaping Use: Never used   Substance and Sexual Activity   • Alcohol use: Not Currently   • Drug use: Not Currently   • Sexual activity: Not on file   Other Topics Concern   • Not on file   Social History Narrative    Denied: Alcohol use (history) - As per [employer]    Caffeine use    Uses safety equipment: Seatbelts      Social Determinants of Health     Financial Resource Strain: Not on file   Food Insecurity: Not on file   Transportation Needs: Not on file   Physical Activity: Not on file   Stress: Not on file   Social Connections: Not on file   Intimate Partner Violence: Not on file   Housing Stability: Not on file     Medications have been verified. Objective   /74   Pulse 74   Temp 97.8 °F (36.6 °C)   Resp 18   Ht 6' (1.829 m)   Wt 73.9 kg (163 lb)   SpO2 98%   BMI 22.11 kg/m²      Physical Exam   Physical Exam  Vitals and nursing note reviewed. Constitutional:       General: He is not in acute distress.      Appearance: He is well-developed. HENT:      Head: Normocephalic and atraumatic. Right Ear: Tympanic membrane normal.      Left Ear: Tympanic membrane normal.      Nose: Rhinorrhea present. No mucosal edema. Mouth/Throat:      Mouth: Mucous membranes are moist.      Pharynx: Posterior oropharyngeal erythema present. Cardiovascular:      Rate and Rhythm: Normal rate and regular rhythm. Heart sounds: Normal heart sounds. Pulmonary:      Effort: Pulmonary effort is normal. No respiratory distress. Breath sounds: Normal breath sounds.

## 2023-10-04 ENCOUNTER — OFFICE VISIT (OUTPATIENT)
Dept: PODIATRY | Facility: CLINIC | Age: 68
End: 2023-10-04
Payer: MEDICARE

## 2023-10-04 ENCOUNTER — APPOINTMENT (OUTPATIENT)
Dept: RADIOLOGY | Facility: CLINIC | Age: 68
End: 2023-10-04
Payer: MEDICARE

## 2023-10-04 VITALS — WEIGHT: 163 LBS | HEIGHT: 72 IN | BODY MASS INDEX: 22.08 KG/M2

## 2023-10-04 DIAGNOSIS — M79.672 PAIN OF LEFT HEEL: ICD-10-CM

## 2023-10-04 DIAGNOSIS — M72.2 PLANTAR FASCIITIS OF LEFT FOOT: Primary | ICD-10-CM

## 2023-10-04 DIAGNOSIS — M76.62 ACHILLES TENDINITIS OF LEFT LOWER EXTREMITY: ICD-10-CM

## 2023-10-04 DIAGNOSIS — M24.572 EQUINUS CONTRACTURE OF LEFT ANKLE: ICD-10-CM

## 2023-10-04 PROCEDURE — 73630 X-RAY EXAM OF FOOT: CPT

## 2023-10-04 PROCEDURE — 99203 OFFICE O/P NEW LOW 30 MIN: CPT | Performed by: PODIATRIST

## 2023-10-04 NOTE — PATIENT INSTRUCTIONS
[employer]: [employer] Professional-Grade High Arch Support Orthotic Shoe Inserts for Maximum Support Mariusze, Green, 13.5-15 Men : Health & Household     [employer]    Achilles Eccentric Calf stretching    Vionic Shoes: Comfortable Stylish Shoes, Sandals, Boots & More       Plantar Fasciitis   WHAT YOU NEED TO KNOW:   Plantar fasciitis is swelling of the plantar fascia. The plantar fascia is a thick band of tissue that connects your heel bone to your toes. This part of your foot helps support the arch of your foot and absorbs shock. DISCHARGE INSTRUCTIONS:   Call your doctor if:   Your pain or swelling suddenly increases. You develop knee, hip, or back pain. You have questions or concerns about your condition or care. Medicines: You may  need any of the following:  Acetaminophen  decreases pain and fever. It is available without a doctor's order. Ask how much to take and how often to take it. Follow directions. Read the labels of all other medicines you are using to see if they also contain acetaminophen, or ask your doctor or pharmacist. Acetaminophen can cause liver damage if not taken correctly. NSAIDs , such as ibuprofen, help decrease swelling, pain, and fever. NSAIDs can cause stomach bleeding or kidney problems in certain people. If you take blood thinner medicine, always ask your healthcare provider if NSAIDs are safe for you. Always read the medicine label and follow directions. Take your medicine as directed. Contact your healthcare provider if you think your medicine is not helping or if you have side effects. Tell your provider if you are allergic to any medicine. Keep a list of the medicines, vitamins, and herbs you take. Include the amounts, and when and why you take them. Bring the list or the pill bottles to follow-up visits. Carry your medicine list with you in case of an emergency. Self-care:   Wear your splint or shoe inserts as directed.   You may need to wear a splint at night to keep your foot stretched while you sleep. This will help prevent sharp pain first thing in the morning. Shoe inserts will help decrease stress on your plantar fascia when you walk or exercise. Apply ice on your plantar fascia. Ice helps decrease swelling and pain. Fill a water bottle with water and freeze it. Wrap a towel around the bottle or cover it with a pillow case. Roll the water bottle under your foot for 10 minutes each morning and evening. Massage your plantar fascia as directed. This may help decrease swelling and pain. Roll a golf ball under your foot for 10 minutes. Repeat 3 times each day. Go to physical therapy as directed. A physical therapist teaches you exercises to help improve movement and strength, and to decrease pain. Prevent plantar fasciitis:   Maintain a healthy weight. This will help decrease stress on your feet. Ask your healthcare provider what a healthy weight is for you. Ask him or her to help you create a weight loss plan, if needed. Do low-impact exercises. Low-impact exercises decrease stress on your plantar fascia. Examples include swimming or bicycling. Start new activities slowly. Increase the intensity and time gradually. Wear shoes that fit well and support your arch. Replace your shoes before the padding or shock absorption wears out. Do not walk or  bare feet or sandals for long periods of time. Follow up with your doctor as directed:  Write down your questions so you remember to ask them during your visits. © Copyright Ephraim Escobar 2023 Information is for End User's use only and may not be sold, redistributed or otherwise used for commercial purposes. The above information is an  only. It is not intended as medical advice for individual conditions or treatments. Talk to your doctor, nurse or pharmacist before following any medical regimen to see if it is safe and effective for you.     Achilles Tendinitis AMBULATORY CARE:   Achilles tendinitis  is swelling of the tendon that connects your calf muscle to your heel bone. It may happen suddenly or become a chronic condition. Your risk for Achilles tendinitis increases as you age. Contact your healthcare provider if:   You have a fever. Your swelling or pain gets worse. You feel or hear a sudden pop near your ankle. You cannot bend your ankle or put pressure on your leg. You have questions about your condition or care. Common signs and symptoms include the following:   Pain in your heel that gets worse with activity    Swelling in your heel or calf    Stiffness in your heel or calf    Treatment of Achilles tendinitis  may include medicine, physical therapy, or support devices. You may need surgery or other procedures if your Achilles tendinitis does not get better with other treatments. NSAIDs , such as ibuprofen, help decrease swelling, pain, and fever. This medicine is available with or without a doctor's order. NSAIDs can cause stomach bleeding or kidney problems in certain people. If you take blood thinner medicine, always ask your healthcare provider if NSAIDs are safe for you. Always read the medicine label and follow directions. Take your medicine as directed. Contact your healthcare provider if you think your medicine is not helping or if you have side effects. Tell your provider if you are allergic to any medicine. Keep a list of the medicines, vitamins, and herbs you take. Include the amounts, and when and why you take them. Bring the list or the pill bottles to follow-up visits. Carry your medicine list with you in case of an emergency. Manage your Achilles tendinitis:   Rest  as directed. Rest decreases swelling and prevents your tendinitis from getting worse. Your healthcare provider may tell you to stop your usual training or exercise activities. Ask when you can return to your normal activities or exercise plan.     Apply ice  on your Achilles tendon for 15 to 20 minutes every hour or as directed. Use an ice pack, or put crushed ice in a plastic bag. Cover it with a towel. Ice helps prevent tissue damage and decreases swelling and pain. Wear a compression bandage or use tape  as directed. This will decrease swelling and pain. Ask your healthcare provider how to wrap a compression bandage or apply tape. If you use a support device ask if you should wear a compression bandage or use tape. Elevate  your heel above the level of your heart as often as you can. This will help decrease swelling and pain. Prop your heel on pillows or blankets to keep it elevated comfortably. Stretch  as directed when you return to your exercise program. Always warm up your muscles and stretch before you exercise. Do cool down exercises and stretches when you are finished. This will keep your muscles loose and decrease stress on your Achilles tendon. Do bilateral heel drop exercises as directed. Bilateral heel drops strengthen your Achilles tendon. Do not do the following exercise unless your healthcare provider says it is safe:    Stand at the edge of a stair or raised step. Hold onto the railing for balance. Place the front part of your foot on the stair or step. Let the back of your foot hang off of the stair or step. Slowly lift your heels off the ground and then slowly lower your heels past the stair. Do not move your heels quickly. This could make your injury worse. Repeat this exercise 20 times or as directed. Slowly increase the time and intensity when you return to your exercise program.  Start with short and low intensity exercises. Ask your healthcare provider how and when to increase the time and intensity of your exercise. Wear support devices or supportive shoes as directed. Support devices may include a splint, orthotic, or brace. These devices will decrease pressure on your Achilles tendon and help relieve pain. Supportive shoes will cushion your heel and protect your Achilles tendon. Replace shoes or sneakers that are worn out. Go to physical therapy and practice exercises as directed:  A physical therapist teaches you exercises to help improve movement and strength, and decrease pain. Practice these exercises at home as directed. Follow up with your doctor as directed:  Write down your questions so you remember to ask them during your visits. © Copyright Marcus Garcia 2023 Information is for End User's use only and may not be sold, redistributed or otherwise used for commercial purposes. The above information is an  only. It is not intended as medical advice for individual conditions or treatments. Talk to your doctor, nurse or pharmacist before following any medical regimen to see if it is safe and effective for you.

## 2023-10-04 NOTE — PROGRESS NOTES
Podiatry - Clinic Visit  Marylou Wilder 76 y.o. male MRN: [id number]    Assessment/Plan    No problem-specific Assessment & Plan notes found for this encounter. Diagnoses and all orders for this visit:    Plantar fasciitis of left foot  -     Orthotics B/L  -     P.F. Night Splint  -     Ambulatory referral to Physical Therapy; Future    Pain of left heel  -     Ambulatory Referral to Podiatry  -     X-ray foot left 3+ views; Future  -     Orthotics B/L  -     P.F. Night Splint  -     Ambulatory referral to Physical Therapy; Future    Achilles tendinitis of left lower extremity  -     Orthotics B/L  -     P.F. Night Splint  -     Ambulatory referral to Physical Therapy; Future    Equinus contracture of left ankle  -     Orthotics B/L  -     P.F. Night Splint  -     Ambulatory referral to Physical Therapy; Future         Plan:  - Pt seen/examined  - injection of the Left plantar fascia refused. - Rx given for CMO and night splint  - instructions given for conservative care which was also demonstrated during the clinical visit  - Explained at length the biomechanical abnormalities leading to the significant overload of the plantar fascia.   -Sent to PT on the next visit to aide in reduction of equinus and also address the plantar fascia and gastroc aponeurosis with graston technique. - Recommend Asics, Vionic, New balance, or rigid shoes at all times in the house. - Xr 3 views taken and reviewed  And shoe slight pes planus, miniscle posterior heel spur, slight pes planus    History of Present Illness     HPI:  Patient presents with pain to the Left  plantar foot, heel. This has been present for a little bit over a month. They have experienced first step in the morning pain, pain after sitting, pain with continued weightbearing, pain that worsens as the day goes on, achy pain, pain with activity and pain with inacivity. They have attempted changes in activity without success.  They are having continued pain.    Review of Systems   Constitutional: Negative. HENT: Negative. Eyes: Negative. Respiratory: Negative. Cardiovascular: Negative. Gastrointestinal: Negative. Musculoskeletal: neg   Skin: negative  Neurological: Negative. Historical Information   History reviewed. No pertinent past medical history. Past Surgical History:   Procedure Laterality Date   • EYE SURGERY       Social History   Social History     Substance and Sexual Activity   Alcohol Use Never     Social History     Substance and Sexual Activity   Drug Use Never     Social History     Tobacco Use   Smoking Status Never   Smokeless Tobacco Never     Family History:   Family History   Problem Relation Age of Onset   • Heart disease Mother    • Cancer Mother    • Colon cancer Paternal Grandfather    • Diabetes Family        Meds/Allergies   (Not in a hospital admission)    Allergies   Allergen Reactions   • Penicillins Rash     Category: Allergy;        Objective   First Vitals:   @VSFIRST2(5,8,6,7,9,11,14,10:FIRST)@    Current Vitals:   Height: 6' (182.9 cm) (10/04/23 0923)  Weight - Scale: 73.9 kg (163 lb) (10/04/23 0923)        Ht 6' (1.829 m)   Wt 73.9 kg (163 lb)   BMI 22.11 kg/m²     General Appearance:    Alert, cooperative, no distress    Constitutional: Patient is not distressed. Patient is well developed. Patient is not obese. Extremities:   MMT is 5/5 to all compartments of the LE, +0/4 edema B/L, Digital ROM is intact, Pain on palpation of TTP at the origin of the L plantar fascia and TTP on the posterior aspect of the L heel Normal range of motion first MTPJ. Manual muscle testing 5 out of 5 for inversion/eversion/dorsiflexion/plantarflexion.   On stance patients feet are generally pes planus and pes planus  With knee extended patient is unable to get foot above 95 degrees indicated significant overload of the posterior musculature   Pulses:   R DP is +2/4, R PT is +2/4, L DP is +2/4, L PT is +2/4, CFT< 3sec to all digits. No erythema. No edema. No significant varicosities. Skin:   no open lesions. Normal texture, turgor. Dermatology: No rash. No open lesions. Present pedal hair. Skin has healthy turgor. Neurological: Monofilament sensation is intact. Vibratory sensation is intact. Achilles reflex is normal.   Proprioception is normal    Respiratory: Normal respiratory effort, no distress    Psych: Patient is AAOx3. Normal mood.      Lymphatic: nonpalpable popliteal lymph nodes  Nonpalpable groin lymph nodes

## 2023-10-09 ENCOUNTER — TELEPHONE (OUTPATIENT)
Dept: PODIATRY | Facility: CLINIC | Age: 68
End: 2023-10-09

## 2023-10-09 NOTE — TELEPHONE ENCOUNTER
Caller: Kelsey Cortes    Doctor: Ayaan Victor DPM    Reason for call: There is an order in his chart for a splint. He would like to know where he can get it. He also said that Dr. Francine Montejo said there are You Tube videos for him to watch but he did not get the name of them.     Call back#: [phone]

## 2023-10-11 NOTE — PROGRESS NOTES
PT Evaluation     Today's date: 10/12/2023  Patient name: Kelsie Posey  : 1955  MRN: [id number]  Referring provider: Burr Krabbe  Dx:   Encounter Diagnosis     ICD-10-CM    1. Achilles tendinitis of left lower extremity  M76.62 Ambulatory referral to Physical Therapy     PT plan of care cert/re-cert      2. Plantar fasciitis of left foot  M72.2 Ambulatory referral to Physical Therapy     PT plan of care cert/re-cert      3. Pain of left heel  M79.672 Ambulatory referral to Physical Therapy     PT plan of care cert/re-cert      4. Equinus contracture of left ankle  M24.572 Ambulatory referral to Physical Therapy     PT plan of care cert/re-cert          Start Time: 815  Stop Time:   Total time in clinic (min): 55 minutes    Assessment  Assessment details: Kelsie Posey is a 76 y.o. male presenting to outpatient physical therapy for L foot/ankle pain. Per eval today, noted impairments include pain, impaired gastrocnemius/soleus soft tissue extensibility, reduced ankle range of motion, reduced gluteal/hip and ankle strength, and reduced activity tolerance. Signs and symptoms at present are consistent with referring diagnosis of plantar fasciitis and achilles tendinitis. 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 Patient to benefit from skilled outpatient physical therapy 2x/week for 8-10 weeks in order to reduce pain, maximize pain free ankle range of motion, increase hip/glute/ankle strength and stability, and improve ambulation performance and gait in order to maximize function and reduce limitations. Home exercise program was provided and all questions answered to patient's level of satisfaction. Pt was educated on importance of compliance with HEP.  Thank you for your referral. Impairments: abnormal gait, abnormal or restricted ROM, activity intolerance, impaired physical strength, lacks appropriate home exercise program, pain with function and poor body mechanics    Symptom irritability: moderateUnderstanding of Dx/Px/POC: good   Prognosis: fair    Goals  STGs to be achieved in 4-6 weeks:  1. Pt to demonstrate reduced subjective pain rating "at worst" by at least 2-3 points from Initial Eval in order to allow for reduced pain with ADLs and improved functional activity tolerance. 2. Pt to demonstrate increased PROM of L DF by at least 4 degrees in order to maximize joint mobility and function and allow for progression of exercise program and achievement of goals. 3. Pt to demonstrate increased MMT of hip abduction and extension, and ankle DF and inv/ev by at least 1/2-1 grade in order to improve safety and stability with ADLs and functional mobility. 4. Pt to demo increased PROM of L great toe extension by at least 10 degrees to improve ambulation and gait mechanics. LTGs to be achieved by discharge:  1. Pt will be I with HEP in order to continue to improve quality of life and independence and reduce risk for re-injury. 2. Pt to demonstrate return to taking long walks around house/out in community without current limitation of L heel pain. 3. 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 4. Pt to demo 10 degrees PROM L DF to improve gait mechanics and performance of ADLs.     Plan  Patient would benefit from: skilled physical therapy  Planned modality interventions: cryotherapy  Planned therapy interventions: IASTM, joint mobilization, manual therapy, massage, neuromuscular re-education, patient education, strengthening, stretching, therapeutic activities, therapeutic exercise, therapeutic training, home exercise program, gait training, functional ROM exercises, flexibility, coordination, body mechanics training, balance, activity modification and abdominal trunk stabilization  Frequency: 2x week  Duration in weeks: 10  Plan of Care beginning date: 10/12/2023  Plan of Care expiration date: 2024  Treatment plan discussed with: patient        Subjective Evaluation    History of Present Illness  Mechanism of injury: Pt is presenting to OPPT with L heel/foot pain. Pt reports he broke R foot in 2019 and has had issues with L foot/ankle ever since due to compensation. He reports he also has injured L foot multiple times in the past years, but can not remember exact injuries. He reports now he has pain in the heel of L foot that has been going on for about a month. He reports he also has pain on top of the foot as well. He reports he has started to walk on the toes due to pain in heel. He reports that it hurts very bad in the morning and when he takes initial steps after resting. He reports that it hurts directly in the L heel. He denies any numbness/tingling. He reports that he feels the foot/ankle is getting weak due to disuse due to pain. He reports that he avoids walking due to the pain. He reports he used to walk for miles and can not anymore. 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 He reports when wearing nonsupportive shoes, it hurts more and when walking on level hard surfaces like cement. He reports doing housework also can be bothersome, along with steps. He denies any SCOOBY that he can remember.            Recurrent probem    Quality of life: fair    Patient Goals  Patient goals for therapy: decreased pain, improved balance, increased motion, increased strength, independence with ADLs/IADLs and return to sport/leisure activities  Patient goal: get back to walking longer distances, decrease heel pain  Pain  Current pain ratin  At best pain ratin  At worst pain ratin  Location: L heel  Quality: sharp, knife-like and burning  Relieving factors: ice and rest  Aggravating factors: standing, walking, stair climbing and lifting  Progression: worsening    Social Support  Stairs in house: yes   Lives in: multiple-level home  Lives with: spouse    Employment status: working  Treatments  Current treatment: physical therapy      Objective     Tenderness   Left Ankle/Foot   Tenderness in the mid-plantar aspect and plantar fascia. No tenderness in the anterior talofibular ligament, calcaneofibular ligament, peroneal tendon, posterior tibial tendon and talar dome. Neurological Testing     Sensation     Ankle/Foot   Left Ankle/Foot   Intact: light touch    Right Ankle/Foot   Intact: light touch     Reflexes   Left   Patellar (L4): normal (2+)  Achilles (S1): normal (2+)  Clonus sign: negative    Right   Patellar (L4): normal (2+)  Achilles (S1): normal (2+)  Clonus sign: negative    Passive Range of Motion   Left Ankle/Foot    Dorsiflexion (ke): 4 degrees   Plantar flexion: 50 degrees   Inversion: 30 degrees   Eversion: 35 degrees   Great toe extension: 30 degrees     Strength/Myotome Testing     Left Hip   Planes of Motion   Flexion: 4  Extension: 4  Abduction: 4  Adduction: 4+  External rotation: 4  Internal rotation: 4+    Right Hip   Planes of Motion   Flexion: 4+  Extension: 4  Abduction: 4  Adduction: 4+  External rotation: 4+  Internal rotation: 4+    Left Knee   Flexion: 4+  Extension: 4+    Right Knee   Flexion: 4+  Extension: 4+    Left Ankle/Foot   Dorsiflexion: 4  Plantar flexion: 5  Inversion: 4  Eversion: 4  Great toe extension: 4    Right Ankle/Foot   Dorsiflexion: 4+  Plantar flexion: 4+  Inversion: 4+  Great toe flexion: 4+  Great toe extension: 4+    Tests   Left Ankle/Foot   Negative for anterior drawer, eversion talar tilt, inversion talar tilt, posterior drawer, syndesmosis squeeze and Espino.      Additional Tests Details  Hypomobile subtalar joint, hypomobile talocrural joint    Ambulation Observational Gait   Left step length and right step length within functional limits. Left foot contact pattern: toe to heel    Additional Observational Gait Details  Tends to remain on L toes throughout stance phase       Precautions:       Re-eval Date:  every 8 visits     Date 10/12/2023        Visit Count 1       FOTO 10/12/2023        Pain In See IE       Pain Out See IE           Manuals 10/12/2023        IASTM                                Neuro Re-Ed        Tandem stance on foam        Clock balance                                                 Ther Ex        Ankle alphabet  reviewed       Gastroc stretch  reviewed       Arch lift reviewed       Toe yoga reviewed       Seated plantar fascia stretch reviewed       AROM tb ankle 4 way        Heel raises        Soleus stretch        Bridges         Clamshells         Side steps with tb, monster walk        Leg press         squats                        Ther Activity                        Gait Training                        Modalities                              10/12/2023  - HEP was issued and reviewed this date for above noted exercises. Pt demonstrated understanding without incident and without questions/concerns. Will continue to update upcoming. Access Code: [email or web address]  URL: [email or web address]  Date: 10/12/2023  Prepared by:  Lou Mitchell    Exercises  - Seated Ankle Alphabet  - 1 x daily - 7 x weekly - 2 sets  - Gastroc Stretch on Wall  - 1 x daily - 7 x weekly - 1 sets - 3 reps - 30 sec hold  - Long Sitting Calf Stretch with Strap  - 1 x daily - 7 x weekly - 1 sets - 3 reps - 30 sec hold  - Arch Lifting  - 1 x daily - 7 x weekly - 2 sets - 10 reps  - Toe Yoga - Alternating Great Toe and Lesser Toe Extension  - 1 x daily - 7 x weekly - 2 sets - 10 reps  - Seated Plantar Fascia Stretch  - 1 x daily - 7 x weekly - 1 sets - 3 reps - 30 sec hold

## 2023-10-12 ENCOUNTER — EVALUATION (OUTPATIENT)
Dept: PHYSICAL THERAPY | Facility: CLINIC | Age: 68
End: 2023-10-12
Payer: MEDICARE

## 2023-10-12 DIAGNOSIS — M79.672 PAIN OF LEFT HEEL: ICD-10-CM

## 2023-10-12 DIAGNOSIS — M72.2 PLANTAR FASCIITIS OF LEFT FOOT: ICD-10-CM

## 2023-10-12 DIAGNOSIS — M24.572 EQUINUS CONTRACTURE OF LEFT ANKLE: ICD-10-CM

## 2023-10-12 DIAGNOSIS — M76.62 ACHILLES TENDINITIS OF LEFT LOWER EXTREMITY: Primary | ICD-10-CM

## 2023-10-12 PROCEDURE — 97161 PT EVAL LOW COMPLEX 20 MIN: CPT

## 2023-10-12 PROCEDURE — 97110 THERAPEUTIC EXERCISES: CPT

## 2023-10-17 ENCOUNTER — OFFICE VISIT (OUTPATIENT)
Dept: PHYSICAL THERAPY | Facility: CLINIC | Age: 68
End: 2023-10-17
Payer: MEDICARE

## 2023-10-17 DIAGNOSIS — M76.62 ACHILLES TENDINITIS OF LEFT LOWER EXTREMITY: Primary | ICD-10-CM

## 2023-10-17 DIAGNOSIS — M79.672 PAIN OF LEFT HEEL: ICD-10-CM

## 2023-10-17 DIAGNOSIS — M72.2 PLANTAR FASCIITIS OF LEFT FOOT: ICD-10-CM

## 2023-10-17 DIAGNOSIS — M24.572 EQUINUS CONTRACTURE OF LEFT ANKLE: ICD-10-CM

## 2023-10-17 PROCEDURE — 97140 MANUAL THERAPY 1/> REGIONS: CPT

## 2023-10-17 PROCEDURE — 97110 THERAPEUTIC EXERCISES: CPT

## 2023-10-17 PROCEDURE — 97112 NEUROMUSCULAR REEDUCATION: CPT

## 2023-10-17 NOTE — PROGRESS NOTES
Daily Note     Today's date: 10/17/2023  Patient name: Elie Figueroa  : 1955  MRN: [id number]  Referring provider: Loree Hoffman  Dx:   Encounter Diagnosis     ICD-10-CM    1. Achilles tendinitis of left lower extremity  M76.62       2. Plantar fasciitis of left foot  M72.2       3. Pain of left heel  M79.672       4. Equinus contracture of left ankle  M24.572           Start Time: 830  Stop Time: 925  Total time in clinic (min): 55 minutes    Subjective: "My foot always hurts and my L knee is starting to hurt because I am walking different."      Objective: See treatment diary below      Assessment: Tolerated treatment well. Initiated exercise program this date. Limited strength and mobility of great toe and midfoot. Max cueing to stay on task and complete exercises. Will continue to monitor and progress as able. Patient demonstrated fatigue post treatment and would benefit from continued PT      Plan: Continue per plan of care. Progress treatment as tolerated.        Precautions:       Re-eval Date: every 8 visits     Date 10/12/2023  10/17      Visit Count 1 2      FOTO 10/12/2023        Pain In See IE       Pain Out See IE           Manuals 10/12/2023  10/17      IASTM  PROM greater toe, midfoot, talonavic, mvt, AROM 15'                              Neuro Re-Ed        Tandem stance on foam        Clock balance                                                 Ther Ex        Ankle alphabet  reviewed       Gastroc stretch  reviewed 4x30"      Arch lift reviewed Towel curls  20x/3-5"    Arch lifts  10x/5"      Toe yoga reviewed 10-20x/3-5"      Seated plantar fascia stretch reviewed       AROM tb ankle 4 way  Grn 10-20x/3-5"      Heel raises  20x      Soleus stretch  4x30"      Bridges         Clamshells         Side steps with tb, monster walk        Leg press         squats                        Ther Activity                        Gait Training                        Modalities

## 2023-10-18 ENCOUNTER — OFFICE VISIT (OUTPATIENT)
Dept: INTERNAL MEDICINE CLINIC | Facility: CLINIC | Age: 68
End: 2023-10-18
Payer: MEDICARE

## 2023-10-18 VITALS
HEIGHT: 73 IN | BODY MASS INDEX: 21.76 KG/M2 | SYSTOLIC BLOOD PRESSURE: 152 MMHG | HEART RATE: 63 BPM | WEIGHT: 164.2 LBS | DIASTOLIC BLOOD PRESSURE: 76 MMHG | TEMPERATURE: 99.5 F | OXYGEN SATURATION: 96 %

## 2023-10-18 DIAGNOSIS — Z91.038 ALLERGY TO HYMENOPTERA VENOM: ICD-10-CM

## 2023-10-18 DIAGNOSIS — Z11.59 NEED FOR HEPATITIS C SCREENING TEST: ICD-10-CM

## 2023-10-18 DIAGNOSIS — Z12.5 SCREENING PSA (PROSTATE SPECIFIC ANTIGEN): ICD-10-CM

## 2023-10-18 DIAGNOSIS — Z23 ENCOUNTER FOR IMMUNIZATION: ICD-10-CM

## 2023-10-18 DIAGNOSIS — Z13.1 SCREENING FOR DIABETES MELLITUS: ICD-10-CM

## 2023-10-18 DIAGNOSIS — Z12.11 SCREENING FOR COLON CANCER: ICD-10-CM

## 2023-10-18 DIAGNOSIS — C44.42 SQUAMOUS CELL CARCINOMA OF NECK: ICD-10-CM

## 2023-10-18 DIAGNOSIS — Z13.220 SCREENING FOR HYPERCHOLESTEROLEMIA: ICD-10-CM

## 2023-10-18 DIAGNOSIS — Z13.0 SCREENING, ANEMIA, DEFICIENCY, IRON: ICD-10-CM

## 2023-10-18 DIAGNOSIS — L89.892 PRESSURE ULCER OF TOE OF RIGHT FOOT, STAGE 2 (HCC): ICD-10-CM

## 2023-10-18 DIAGNOSIS — M17.12 PRIMARY OSTEOARTHRITIS OF LEFT KNEE: Primary | ICD-10-CM

## 2023-10-18 PROCEDURE — 99204 OFFICE O/P NEW MOD 45 MIN: CPT | Performed by: INTERNAL MEDICINE

## 2023-10-18 RX ORDER — EPINEPHRINE 0.3 MG/.3ML
0.3 INJECTION SUBCUTANEOUS ONCE
Qty: 0.6 ML | Refills: 0 | Status: SHIPPED | OUTPATIENT
Start: 2023-10-18 | End: 2023-10-18

## 2023-10-19 ENCOUNTER — OFFICE VISIT (OUTPATIENT)
Dept: PHYSICAL THERAPY | Facility: CLINIC | Age: 68
End: 2023-10-19
Payer: MEDICARE

## 2023-10-19 DIAGNOSIS — M72.2 PLANTAR FASCIITIS OF LEFT FOOT: ICD-10-CM

## 2023-10-19 DIAGNOSIS — M24.572 EQUINUS CONTRACTURE OF LEFT ANKLE: ICD-10-CM

## 2023-10-19 DIAGNOSIS — M79.672 PAIN OF LEFT HEEL: ICD-10-CM

## 2023-10-19 DIAGNOSIS — M76.62 ACHILLES TENDINITIS OF LEFT LOWER EXTREMITY: Primary | ICD-10-CM

## 2023-10-19 PROCEDURE — 97110 THERAPEUTIC EXERCISES: CPT

## 2023-10-19 PROCEDURE — 97140 MANUAL THERAPY 1/> REGIONS: CPT

## 2023-10-19 NOTE — PROGRESS NOTES
Daily Note     Today's date: 10/19/2023  Patient name: Joel Garces  : 1955  MRN: [id number]  Referring provider: Cherelle Valdez  Dx:   Encounter Diagnosis     ICD-10-CM    1. Achilles tendinitis of left lower extremity  M76.62       2. Plantar fasciitis of left foot  M72.2       3. Pain of left heel  M79.672       4. Equinus contracture of left ankle  M24.572           Start Time: 835  Stop Time: 920  Total time in clinic (min): 45 minutes    Subjective: "I was walking more around the house without shoes and I told I shouldn't do that. I have all the pain in my heel. I got a script for my L knee; which has been hurting since the summer."      Objective: See treatment diary below      Assessment: Tolerated treatment well. Pt able to complete program without incident. Good motion in great toe noted despite pt noting limitations. Cueing of ankle motion against resistance needed and redirection. No change in pain at end of session. Pt is scheduled for eval for L knee. Will continue to progress as able to address deficits. Patient demonstrated fatigue post treatment and would benefit from continued PT      Plan: Continue per plan of care. Progress treatment as tolerated.        Precautions:       Re-eval Date: every 8 visits     Date 10/12/2023  10/17 10/19     Visit Count 1 2 3     FOTO 10/12/2023        Pain In See IE  2-3/10     Pain Out See IE  2-3/10         Manuals 10/12/2023  10/17 10/19     IASTM  PROM greater toe, midfoot, talonavic, mvt, AROM 15' PROM greater toe, midfoot, talonavic, mvt, AROM 15'                             Neuro Re-Ed        Tandem stance on foam        Clock balance                                                 Ther Ex        Ankle alphabet  reviewed       Gastroc stretch  reviewed 4x30" 4x30"     Arch lift reviewed Towel curls  20x/3-5"    Arch lifts  10x/5" Towel curls  20x/3-5"    Arch lifts  10x/5"     Toe yoga reviewed 10-20x/3-5" 10-20x/3-5"     Seated plantar fascia stretch reviewed       AROM tb ankle 4 way  Grn 10-20x/3-5" Grn 10-20x/3-5"     Heel raises  20x 30x     Soleus stretch  4x30" 4x30"     Bridges         Clamshells         Side steps with tb, monster walk        Leg press         squats                        Ther Activity                        Gait Training                        Modalities

## 2023-10-20 ENCOUNTER — LAB (OUTPATIENT)
Dept: LAB | Facility: CLINIC | Age: 68
End: 2023-10-20
Payer: MEDICARE

## 2023-10-20 DIAGNOSIS — Z11.59 NEED FOR HEPATITIS C SCREENING TEST: ICD-10-CM

## 2023-10-20 DIAGNOSIS — Z12.5 SCREENING PSA (PROSTATE SPECIFIC ANTIGEN): ICD-10-CM

## 2023-10-20 DIAGNOSIS — Z13.0 SCREENING, ANEMIA, DEFICIENCY, IRON: ICD-10-CM

## 2023-10-20 DIAGNOSIS — Z91.038 ALLERGY TO HYMENOPTERA VENOM: ICD-10-CM

## 2023-10-20 DIAGNOSIS — Z13.1 SCREENING FOR DIABETES MELLITUS: ICD-10-CM

## 2023-10-20 DIAGNOSIS — Z13.220 SCREENING FOR HYPERCHOLESTEROLEMIA: ICD-10-CM

## 2023-10-20 LAB
ALBUMIN SERPL BCP-MCNC: 4.1 G/DL (ref 3.5–5)
ALP SERPL-CCNC: 55 U/L (ref 34–104)
ALT SERPL W P-5'-P-CCNC: 26 U/L (ref 7–52)
ANION GAP SERPL CALCULATED.3IONS-SCNC: 8 MMOL/L
AST SERPL W P-5'-P-CCNC: 29 U/L (ref 13–39)
BASOPHILS # BLD AUTO: 0.08 THOUSANDS/ÂΜL (ref 0–0.1)
BASOPHILS NFR BLD AUTO: 1 % (ref 0–1)
BILIRUB SERPL-MCNC: 0.85 MG/DL (ref 0.2–1)
BUN SERPL-MCNC: 25 MG/DL (ref 5–25)
CALCIUM SERPL-MCNC: 9.2 MG/DL (ref 8.4–10.2)
CHLORIDE SERPL-SCNC: 103 MMOL/L (ref 96–108)
CHOLEST SERPL-MCNC: 216 MG/DL
CO2 SERPL-SCNC: 28 MMOL/L (ref 21–32)
CREAT SERPL-MCNC: 1.02 MG/DL (ref 0.6–1.3)
EOSINOPHIL # BLD AUTO: 0.14 THOUSAND/ÂΜL (ref 0–0.61)
EOSINOPHIL NFR BLD AUTO: 2 % (ref 0–6)
ERYTHROCYTE [DISTWIDTH] IN BLOOD BY AUTOMATED COUNT: 12.8 % (ref 11.6–15.1)
GFR SERPL CREATININE-BSD FRML MDRD: 75 ML/MIN/1.73SQ M
GLUCOSE P FAST SERPL-MCNC: 88 MG/DL (ref 65–99)
HCT VFR BLD AUTO: 46.2 % (ref 36.5–49.3)
HCV AB SER QL: NORMAL
HDLC SERPL-MCNC: 61 MG/DL
HGB BLD-MCNC: 15.1 G/DL (ref 12–17)
IMM GRANULOCYTES # BLD AUTO: 0.01 THOUSAND/UL (ref 0–0.2)
IMM GRANULOCYTES NFR BLD AUTO: 0 % (ref 0–2)
LDLC SERPL CALC-MCNC: 141 MG/DL (ref 0–100)
LYMPHOCYTES # BLD AUTO: 1.88 THOUSANDS/ÂΜL (ref 0.6–4.47)
LYMPHOCYTES NFR BLD AUTO: 28 % (ref 14–44)
MCH RBC QN AUTO: 29.6 PG (ref 26.8–34.3)
MCHC RBC AUTO-ENTMCNC: 32.7 G/DL (ref 31.4–37.4)
MCV RBC AUTO: 91 FL (ref 82–98)
MONOCYTES # BLD AUTO: 0.74 THOUSAND/ÂΜL (ref 0.17–1.22)
MONOCYTES NFR BLD AUTO: 11 % (ref 4–12)
NEUTROPHILS # BLD AUTO: 3.77 THOUSANDS/ÂΜL (ref 1.85–7.62)
NEUTS SEG NFR BLD AUTO: 58 % (ref 43–75)
NRBC BLD AUTO-RTO: 0 /100 WBCS
PLATELET # BLD AUTO: 195 THOUSANDS/UL (ref 149–390)
PMV BLD AUTO: 12.5 FL (ref 8.9–12.7)
POTASSIUM SERPL-SCNC: 4.1 MMOL/L (ref 3.5–5.3)
PROT SERPL-MCNC: 7.1 G/DL (ref 6.4–8.4)
PSA SERPL-MCNC: 1.11 NG/ML (ref 0–4)
RBC # BLD AUTO: 5.1 MILLION/UL (ref 3.88–5.62)
SODIUM SERPL-SCNC: 139 MMOL/L (ref 135–147)
TRIGL SERPL-MCNC: 69 MG/DL
WBC # BLD AUTO: 6.62 THOUSAND/UL (ref 4.31–10.16)

## 2023-10-20 PROCEDURE — 80061 LIPID PANEL: CPT

## 2023-10-20 PROCEDURE — G0103 PSA SCREENING: HCPCS

## 2023-10-20 PROCEDURE — 85025 COMPLETE CBC W/AUTO DIFF WBC: CPT

## 2023-10-20 PROCEDURE — 86003 ALLG SPEC IGE CRUDE XTRC EA: CPT

## 2023-10-20 PROCEDURE — 36415 COLL VENOUS BLD VENIPUNCTURE: CPT

## 2023-10-20 PROCEDURE — 80053 COMPREHEN METABOLIC PANEL: CPT

## 2023-10-20 PROCEDURE — 86803 HEPATITIS C AB TEST: CPT

## 2023-10-22 NOTE — PROGRESS NOTES
PT Evaluation     Today's date: 10/23/2023  Patient name: Adilene Albarran  : 1955  MRN: [id number]  Referring provider: Rosalva Jasmine DO  Dx:   Encounter Diagnosis     ICD-10-CM    1. Achilles tendinitis of left lower extremity  M76.62       2. Plantar fasciitis of left foot  M72.2       3. Pain of left heel  M79.672       4. Equinus contracture of left ankle  M24.572       5. Primary osteoarthritis of left knee  M17.12 Ambulatory Referral to Physical Therapy     PT plan of care cert/re-cert     PT plan of care cert/re-cert          Start Time: 830  Stop Time: 915  Total time in clinic (min): 45 minutes    Assessment  Assessment details: Adilene Albarran is a 76 y.o. male presenting to outpatient physical therapy for L knee pain. Per eval today, noted impairments include knee pain, impaired gastrocnemius/soleus and hamstring soft tissue extensibility, reduced gluteal/hip strength, and reduced activity tolerance. Due to noted impairments, the patient's present functional limitations include difficulty with ADLs, difficulty with lifting/bending, reliance on rest for pain relief, and reduced tolerance for functional activity. Patient to benefit from skilled outpatient physical therapy 2x/week for 8-10 weeks in order to reduce pain/current symptoms, improve performance of functional activity and tolerance of activity, maximize pain free range of motion, and increase hip/glute strength and stability in order to maximize function. Home exercise program was provided and all questions answered to patient's level of satisfaction. Pt was educated on importance of compliance with HEP. PT sessions will now be focused on both L ankle and L knee.  Thank you for your referral.       Impairments: abnormal or restricted ROM, activity intolerance, impaired physical strength, lacks appropriate home exercise program, pain with function and poor body mechanics    Symptom irritability: moderateUnderstanding of Dx/Px/POC: good Prognosis: fair  Prognosis details: Due to chronicity     Goals  Ankle STGs to be achieved in 4-6 weeks:  1. Pt to demonstrate reduced subjective pain rating "at worst" by at least 2-3 points from Initial Eval in order to allow for reduced pain with ADLs and improved functional activity tolerance. 2. Pt to demonstrate increased PROM of L DF by at least 4 degrees in order to maximize joint mobility and function and allow for progression of exercise program and achievement of goals. 3. Pt to demonstrate increased MMT of hip abduction and extension, and ankle DF and inv/ev by at least 1/2-1 grade in order to improve safety and stability with ADLs and functional mobility. 4. Pt to demo increased PROM of L great toe extension by at least 10 degrees to improve ambulation and gait mechanics. Knee ST. Pt to demonstrate reduced subjective pain rating of L knee "at worst" by at least 2-3 points from Initial Eval in order to allow for reduced pain with ADLs and improved functional activity tolerance. 2. Pt to report at least 25-50% improvement in L knee symptoms with ADLs to improve QOL. 3. Pt to demo increased MMT of gluteal musculature by 1/2-1 grade in order to improve safety and stability ADLs. Ankle LTGs to be achieved by discharge:  1. Pt will be I with HEP in order to continue to improve quality of life and independence and reduce risk for re-injury. 2. Pt to demonstrate return to taking long walks around house/out in community without current limitation of L heel pain. 3. 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 4. Pt to demo 10 degrees PROM L DF to improve gait mechanics and performance of ADLs. Knee LTG to be achieved by discharge:   1. Pt will be I with knee HEP in order to continue to improve QOL and independence and reduce risk for re-injury.    2. Pt to report at least 75% improvement in knee symptoms with lifting and bending to improve performance of work duties and QOL. 3. Pt will perform steps with reciprocal pattern to facilitate return to PLOF. Plan  Patient would benefit from: skilled physical therapy  Planned modality interventions: cryotherapy  Planned therapy interventions: IASTM, joint mobilization, manual therapy, massage, neuromuscular re-education, patient education, strengthening, stretching, therapeutic activities, therapeutic exercise, therapeutic training, home exercise program, gait training, functional ROM exercises, flexibility, coordination, body mechanics training, balance, activity modification and abdominal trunk stabilization  Frequency: 2x week  Duration in weeks: 10  Plan of Care beginning date: 10/23/2023  Plan of Care expiration date: 1/1/2024  Treatment plan discussed with: patient        Subjective Evaluation    History of Present Illness  Mechanism of injury: Pt is presenting to OPPT today with reports of L knee pain/discomfort. Pt has been attending PT for L foot pain, which he has been compliant and consistence with. Pt reports L knee pain has been occurring for years. He reports he thinks the pain is from lifting and carrying heavy objects for so many years. He reports that lately it has not been hurting as badly because his activity level is extremely decreased. He denies any swelling currently and changes in sensation. He reports he feels his knee feels like it pops and this really bothers him. Denies any locking or feelings like it will give out. He admits he does wear unsupportive shoes. He reports the knee just "feels awkward" when he moves certain directions. He reports that the leg also overall just feels weak. He reports the L knee bothers him when performing stairs and he performs step to pattern on stairs. He reports sometimes it feels like it is going to buckle and  he pain is felt all around the knee. He reports most difficulty with lifting/bending. Recurrent probem    Quality of life: fair    Patient Goals  Patient goals for therapy: decreased pain, improved balance, increased motion, increased strength, independence with ADLs/IADLs and return to sport/leisure activities  Patient goal: I want to get more in shaper, increase my strength, and get back to lifting and performing activity like I was  Pain  Current pain ratin  At best pain ratin  At worst pain ratin  Location: L knee  Quality: sharp  Relieving factors: rest and change in position  Aggravating factors: standing, walking, stair climbing and lifting (certain angles)  Progression: improved    Social Support  Stairs in house: yes   Lives in: multiple-level home  Lives with: spouse    Employment status: working  Treatments  Current treatment: physical therapy      Objective     Neurological Testing     Sensation     Knee   Left Knee   Intact: Light touch    Right Knee   Intact: light touch     Ankle/Foot   Left Ankle/Foot   Intact: light touch    Right Ankle/Foot   Intact: light touch     Reflexes   Left   Patellar (L4): normal (2+)  Achilles (S1): normal (2+)  Clonus sign: negative    Right   Patellar (L4): normal (2+)  Achilles (S1): normal (2+)  Clonus sign: negative    Passive Range of Motion   Left Knee   Flexion: 135 degrees WFL  Extension: 0 degrees WFL  Left Ankle/Foot    Dorsiflexion (ke): 8 degrees     Additional Passive Range of Motion Details  No pain with PROM or AROM    Mobility   Patellar Mobility:   Left Knee   WFL: medial, lateral, superior and inferior.      Strength/Myotome Testing     Left Hip   Planes of Motion   Flexion: 4  Extension: 4  Abduction: 4  Adduction: 4+  External rotation: 4  Internal rotation: 4+    Isolated Muscles   Gluteus keegan: 4  Gluteus medius: 4    Right Hip   Planes of Motion   Flexion: 4+  Extension: 4  Abduction: 4  Adduction: 4+  External rotation: 4+  Internal rotation: 4+    Isolated Muscles   Gluteus maximums: 4  Gluteus medius: 4    Left Knee   Flexion: 4+  Extension: 4+    Right Knee   Flexion: 4+  Extension: 4+    Left Ankle/Foot   Dorsiflexion: 4  Plantar flexion: 5  Inversion: 4  Eversion: 4  Great toe extension: 4    Right Ankle/Foot   Dorsiflexion: 4+  Plantar flexion: 4+  Inversion: 4+  Great toe flexion: 4+  Great toe extension: 4+    Tests     Left Knee   Negative anterior drawer, lateral Zain, medial Zain, patellar apprehension, posterior drawer, Thessaly's test at 5 degrees, Thessaly's test at 20 degrees, valgus stress test at 0 degrees, valgus stress test at 30 degrees, varus stress test at 0 degrees and varus stress test at 30 degrees. Additional Tests Details  Hypomobile subtalar joint, hypomobile talocrural joint    Ambulation     Observational Gait   Left step length and right step length within functional limits.    Left foot contact pattern: toe to heel    Additional Observational Gait Details  Tends to remain on L toes throughout stance phase       Precautions:     Re-eval Date: every 8 visits      Date 10/12/2023  10/17 10/19  10/23     Visit Count 1 2 3  4     FOTO 10/12/2023       10/23/23     Pain In See IE   2-3/10  see IE     Pain Out See IE   2-3/10  see IE           Manuals 10/12/2023  10/17 10/19  10/23     IASTM   PROM greater toe, midfoot, talonavic, mvt, AROM 15' PROM greater toe, midfoot, talonavic, mvt, AROM 15'                                                 Neuro Re-Ed             Tandem stance on foam             Clock balance                                                                                    Ther Ex             Ankle alphabet  reviewed           Gastroc stretch  reviewed 4x30" 4x30"       Arch lift reviewed Towel curls  20x/3-5"     Arch lifts  10x/5" Towel curls  20x/3-5"     Arch lifts  10x/5"       Toe yoga reviewed 10-20x/3-5" 10-20x/3-5"       Seated plantar fascia stretch reviewed           AROM tb ankle 4 way   Grn 10-20x/3-5" Grn 10-20x/3-5"       Heel raises   20x 30x Soleus stretch   4x30" 4x30"       Bridges         reviewed     Clamshells         reviewed     LAQ    reviewed    Step ups        Side steps with tb, monster walk             Leg press              squats              seated hamstring stretch        reviewed      supine SLR        reviewed     Leg ext/flx machine        Leg press         Ther Activity                                         Gait Training                                         Modalities                                                10/23/23: HEP reviewed and pt was provided copy. All questions answered to pt satisfaction. Access Code: [email or web address]  URL: [email or web address]  Date: 10/23/2023  Prepared by:  Seamus Grady    Exercises  - Clamshell  - 1 x daily - 7 x weekly - 2 sets - 10 reps  - Supine Bridge  - 1 x daily - 7 x weekly - 2 sets - 10 reps  - Seated Hamstring Stretch  - 1 x daily - 7 x weekly - 1 sets - 3 reps - 30 sec hold  - Hooklying Straight Leg Raise  - 1 x daily - 7 x weekly - 2 sets - 10 reps  - Seated Long Arc Quad  - 1 x daily - 7 x weekly - 2 sets - 10 reps

## 2023-10-23 ENCOUNTER — EVALUATION (OUTPATIENT)
Dept: PHYSICAL THERAPY | Facility: CLINIC | Age: 68
End: 2023-10-23
Payer: MEDICARE

## 2023-10-23 DIAGNOSIS — M17.12 PRIMARY OSTEOARTHRITIS OF LEFT KNEE: ICD-10-CM

## 2023-10-23 DIAGNOSIS — M76.62 ACHILLES TENDINITIS OF LEFT LOWER EXTREMITY: Primary | ICD-10-CM

## 2023-10-23 DIAGNOSIS — M72.2 PLANTAR FASCIITIS OF LEFT FOOT: ICD-10-CM

## 2023-10-23 DIAGNOSIS — M24.572 EQUINUS CONTRACTURE OF LEFT ANKLE: ICD-10-CM

## 2023-10-23 DIAGNOSIS — M79.672 PAIN OF LEFT HEEL: ICD-10-CM

## 2023-10-23 PROCEDURE — 97110 THERAPEUTIC EXERCISES: CPT

## 2023-10-23 PROCEDURE — 97161 PT EVAL LOW COMPLEX 20 MIN: CPT

## 2023-10-25 ENCOUNTER — OFFICE VISIT (OUTPATIENT)
Dept: PHYSICAL THERAPY | Facility: CLINIC | Age: 68
End: 2023-10-25
Payer: MEDICARE

## 2023-10-25 DIAGNOSIS — M72.2 PLANTAR FASCIITIS OF LEFT FOOT: ICD-10-CM

## 2023-10-25 DIAGNOSIS — M76.62 ACHILLES TENDINITIS OF LEFT LOWER EXTREMITY: Primary | ICD-10-CM

## 2023-10-25 DIAGNOSIS — M24.572 EQUINUS CONTRACTURE OF LEFT ANKLE: ICD-10-CM

## 2023-10-25 DIAGNOSIS — M79.672 PAIN OF LEFT HEEL: ICD-10-CM

## 2023-10-25 DIAGNOSIS — M17.12 PRIMARY OSTEOARTHRITIS OF LEFT KNEE: ICD-10-CM

## 2023-10-25 LAB
BUMBLE BEE IGE QN: <0.1 KU/L
HONEY BEE IGE QN: 0.37 KU/L
Lab: ABNORMAL
PAPER WASP IGE QN: 4.62 KU/L
WHITEFACED HORNET IGE QN: 2.03 KU/L
YELLOW HORNET IGE QN: 0.34 KU/L
YELLOW JACKET IGE QN: 3.95 KU/L

## 2023-10-25 PROCEDURE — 97110 THERAPEUTIC EXERCISES: CPT

## 2023-10-25 PROCEDURE — 97140 MANUAL THERAPY 1/> REGIONS: CPT

## 2023-10-25 NOTE — RESULT ENCOUNTER NOTE
You are very allergic to paper wasps, yellow jackets and white hornets. I would like to send a fresh EpiPen to the pharmacy.

## 2023-10-25 NOTE — PROGRESS NOTES
Daily Note     Today's date: 10/25/2023  Patient name: Melina Georges  : 1955  MRN: [id number]  Referring provider: Jaime Sharma  Dx:   Encounter Diagnosis     ICD-10-CM    1. Achilles tendinitis of left lower extremity  M76.62       2. Plantar fasciitis of left foot  M72.2       3. Pain of left heel  M79.672       4. Equinus contracture of left ankle  M24.572       5. Primary osteoarthritis of left knee  M17.12           Start Time: 830  Stop Time: 930  Total time in clinic (min): 60 minutes    Subjective: "I had a lot of knee pain after Monday's eval that I was walking with crutches at home. It's better today. My heel still hurts but it's these shoes."      Objective: See treatment diary below      Assessment: Tolerated treatment fair. Reviewed stretching with pt; increased gastroc stretch 2* aggressive stretching. Encouraged different shoes 2* worse pain wearing current shoes, decreased pain in boots at home. Difficulty determining pain levels and where pain current to located. Initiated knee exercises with fair tolerance. Decreased hamstring flexibility noted. Good ankle motion noted in all directions without end range pain. Will continue to monitor and progress as able. Patient demonstrated fatigue post treatment and would benefit from continued PT      Plan: Continue per plan of care. Progress treatment as tolerated.        Precautions:       Precautions:     Re-eval Date: every 8 visits      Date 10/12/2023  10/17 10/19  10/23  10/25   Visit Count 1 2 3  4  5   FOTO 10/12/2023       10/23/23     Pain In See IE   2-3/10  see IE  unable   Pain Out See IE   2-3/10  see IE  unable         Manuals 10/12/2023  10/17 10/19  10/23  10/25   IASTM   PROM greater toe, midfoot, talonavic, mvt, AROM 15' PROM greater toe, midfoot, talonavic, mvt, AROM 15'    PROM greater toe, midfoot, talonavic, mvt, AROM 15'                                             Neuro Re-Ed             Tandem stance on foam             Clock balance                                                                                    Ther Ex             Ankle alphabet  reviewed           Gastroc stretch  reviewed 4x30" 4x30"    MT   Arch lift reviewed Towel curls  20x/3-5"     Arch lifts  10x/5" Towel curls  20x/3-5"     Arch lifts  10x/5"  Towel curls  20x/3-5"     Arch lifts  10x/10"  Towel curls  20x/3-5"     Arch lifts  10x/10"   Toe yoga reviewed 10-20x/3-5" 10-20x/3-5"    10-20x/3-5"   Seated plantar fascia stretch reviewed           AROM tb ankle 4 way   Grn 10-20x/3-5" Grn 10-20x/3-5"       Heel raises   20x 30x    standing 20x/pause   Soleus stretch   4x30" 4x30"    MT   Bridges         reviewed  2x10/3-5"   Clamshells         reviewed     LAQ    reviewed 2x10/3-5"   Step ups        Side steps with tb, monster walk             Leg press              squats              seated hamstring stretch        reviewed  3x30"    supine SLR        reviewed  2x10/3-5"    QS 20x/3-5"   Leg ext/flx machine        Leg press         Ther Activity                                         Gait Training                                         Modalities

## 2023-10-27 NOTE — PROGRESS NOTES
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        Relevant Orders    Allergen Profile, Hymenoptera Profile    Screening for hypercholesterolemia        Relevant Orders    Lipid Panel with Direct LDL reflex (Completed)    Screening for diabetes mellitus        Relevant Orders    Comprehensive metabolic panel (Completed)    Screening PSA (prostate specific antigen)        Relevant Orders    PSA, Total Screen (Completed)    Screening, anemia, deficiency, iron        Relevant Orders    CBC and differential (Completed)    Screening for colon cancer        Relevant Orders    Cologuard    Squamous cell carcinoma of neck        Relevant Orders    Ambulatory Referral to Dermatology           Diagnoses and all orders for this visit:    Primary osteoarthritis of left knee  -     Ambulatory Referral to Physical Therapy; Future    Need for hepatitis C screening test  -     Hepatitis C Antibody; Future    Encounter for immunization    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  -     EPINEPHrine (EPIPEN) 0.3 mg/0.3 mL SOAJ; Inject 0.3 mL (0.3 mg total) into a muscle once for 1 dose  -     Allergen Profile, Hymenoptera Profile; Future    Screening for hypercholesterolemia  -     Lipid Panel with Direct LDL reflex; Future    Screening for diabetes mellitus  -     Comprehensive metabolic panel; Future    Screening PSA (prostate specific antigen)  -     PSA, Total Screen;  Future    Screening, anemia, deficiency, iron  -     CBC and differential; Future    Screening for colon cancer  -     Cologuard    Squamous cell carcinoma of neck  -     Ambulatory Referral to Dermatology; Future    Pressure ulcer of toe of right foot, stage 2 ([address])        No problem-specific Assessment & Plan notes found for this encounter. Subjective:      Patient ID: Joy Mcconnell is a 76 y.o. male. Lesion at the site of prior biopsy and diagnosis of SCC per the patient. The patient Notes chronic medial knee pain for several weeks which he uses ice for. The patient notes no injury or new activity. He uses no OTC meds for this        The following portions of the patient's history were reviewed and updated as appropriate:   He has no past medical history on file. ,  does not have any pertinent problems on file. ,   has a past surgical history that includes Eye surgery. ,  family history includes Cancer in his mother; Colon cancer in his paternal grandfather; Diabetes in his family; Heart disease in his mother. ,   reports that he has never smoked. He has never used smokeless tobacco. He reports that he does not drink alcohol and does not use drugs. ,  is allergic to penicillins. .  Current Outpatient Medications   Medication Sig Dispense Refill   • EPINEPHrine (EPIPEN) 0.3 mg/0.3 mL SOAJ Inject 0.3 mL (0.3 mg total) into a muscle once for 1 dose 0.6 mL 0   • clotrimazole (LOTRIMIN) 1 % cream Apply topically 2 (two) times a day (Patient not taking: Reported on 9/26/2023) 30 g 0     No current facility-administered medications for this visit. Review of Systems   Constitutional:  Negative for chills and fever. HENT:  Negative for ear pain and sore throat. Eyes:  Negative for pain and visual disturbance. Respiratory:  Negative for cough and shortness of breath. Cardiovascular:  Negative for chest pain and palpitations. Gastrointestinal:  Negative for abdominal pain and vomiting. Genitourinary:  Negative for dysuria and hematuria. Musculoskeletal:  Positive for arthralgias. Negative for back pain. Skin:  Positive for wound. Negative for color change and rash.    Neurological:  Negative for seizures and syncope. All other systems reviewed and are negative. Objective:  Vitals:    10/18/23 1028   BP: 152/76   Pulse: 63   Temp: 99.5 °F (37.5 °C)   SpO2: 96%   Weight: 74.5 kg (164 lb 3.2 oz)   Height: 6' 1" (1.854 m)     Body mass index is 21.66 kg/m². Physical Exam  Vitals and nursing note reviewed. Constitutional:       Appearance: He is well-developed. HENT:      Head: Normocephalic and atraumatic. Eyes:      Pupils: Pupils are equal, round, and reactive to light. Cardiovascular:      Rate and Rhythm: Normal rate and regular rhythm. Heart sounds: Normal heart sounds. No murmur heard. Pulmonary:      Effort: Pulmonary effort is normal.      Breath sounds: Normal breath sounds. No stridor. No rales. Abdominal:      General: Bowel sounds are normal. There is no distension. Palpations: Abdomen is soft. Tenderness: There is no abdominal tenderness. Musculoskeletal:         General: No deformity. Normal range of motion. Cervical back: Normal range of motion and neck supple. Legs:    Skin:     General: Skin is warm and dry. Findings: Lesion present. Neurological:      Mental Status: He is alert and oriented to person, place, and time.           PHQ-2/9 Depression Screening    Little interest or pleasure in doing things: 0 - not at all  Feeling down, depressed, or hopeless: 0 - not at all  PHQ-2 Score: 0  PHQ-2 Interpretation: Negative depression screen

## 2023-10-29 ENCOUNTER — APPOINTMENT (OUTPATIENT)
Dept: RADIOLOGY | Facility: CLINIC | Age: 68
End: 2023-10-29
Payer: MEDICARE

## 2023-10-29 ENCOUNTER — OFFICE VISIT (OUTPATIENT)
Dept: URGENT CARE | Facility: CLINIC | Age: 68
End: 2023-10-29
Payer: MEDICARE

## 2023-10-29 VITALS
HEART RATE: 65 BPM | DIASTOLIC BLOOD PRESSURE: 66 MMHG | RESPIRATION RATE: 18 BRPM | TEMPERATURE: 97.7 F | SYSTOLIC BLOOD PRESSURE: 147 MMHG | OXYGEN SATURATION: 99 %

## 2023-10-29 DIAGNOSIS — S69.91XA INJURY OF FINGER OF RIGHT HAND, INITIAL ENCOUNTER: ICD-10-CM

## 2023-10-29 DIAGNOSIS — S60.021A CONTUSION OF RIGHT INDEX FINGER WITHOUT DAMAGE TO NAIL, INITIAL ENCOUNTER: Primary | ICD-10-CM

## 2023-10-29 PROCEDURE — 73130 X-RAY EXAM OF HAND: CPT

## 2023-10-29 PROCEDURE — 99213 OFFICE O/P EST LOW 20 MIN: CPT | Performed by: FAMILY MEDICINE

## 2023-10-29 PROCEDURE — G0463 HOSPITAL OUTPT CLINIC VISIT: HCPCS | Performed by: FAMILY MEDICINE

## 2023-10-29 NOTE — PATIENT INSTRUCTIONS
Discussed my interpretation of x-ray which is no acute osseous abnormalities, official read is pending and will contact if read is different than my own. Can wear provided finger splint and recommend icing area as well as taking over-the-counter Tylenol for pain.

## 2023-10-29 NOTE — PROGRESS NOTES
[facility]        NAME: Maya Corona is a 76 y.o. male  : 1955    MRN: [id number]  DATE: 2023  TIME: 1:08 PM    Assessment and Plan   Contusion of right index finger without damage to nail, initial encounter [S60.021A]  1. Contusion of right index finger without damage to nail, initial encounter  XR hand 3+ vw right            Patient Instructions     Patient Instructions   Discussed my interpretation of x-ray which is no acute osseous abnormalities, official read is pending and will contact if read is different than my own. Can wear provided finger splint and recommend icing area as well as taking over-the-counter Tylenol for pain. Follow up with PCP in 3-5 days. Proceed to  ER if symptoms worsen. Chief Complaint     Chief Complaint   Patient presents with    Finger Injury     Right hand pointer finger, injured on latch from gate          History of Present Illness       Patient is a 71-year-old male presenting today with right finger pain x1 day. Patient notes yesterday while closing a gate that a latch from the gate hit the back of his right index finger, notes he immediately experience significant pain and discomfort but reports the pain dissipated as the day went on, notes that he was working with his hand last night as well as this morning and has been experiencing occasional pain discomfort with certain range of motion's when bending and extending his finger. Denies bruising, swelling, numbness, tingling, redness. Review of Systems   Review of Systems   Constitutional:  Negative for chills and fever. HENT:  Negative for ear pain and sore throat. Eyes:  Negative for pain and visual disturbance. Respiratory:  Negative for cough and shortness of breath. Cardiovascular:  Negative for chest pain and palpitations. Gastrointestinal:  Negative for abdominal pain and vomiting. Genitourinary:  Negative for dysuria and hematuria.    Musculoskeletal: See HPI   Skin:         See HPI   Neurological:  Negative for seizures and syncope. All other systems reviewed and are negative. Current Medications       Current Outpatient Medications:     clotrimazole (LOTRIMIN) 1 % cream, Apply topically 2 (two) times a day (Patient not taking: Reported on 9/26/2023), Disp: 30 g, Rfl: 0    EPINEPHrine (EPIPEN) 0.3 mg/0.3 mL SOAJ, Inject 0.3 mL (0.3 mg total) into a muscle once for 1 dose, Disp: 0.6 mL, Rfl: 0    Current Allergies     Allergies as of 10/29/2023 - Reviewed 10/29/2023   Allergen Reaction Noted    Penicillins Rash 11/30/2011            The following portions of the patient's history were reviewed and updated as appropriate: allergies, current medications, past family history, past medical history, past social history, past surgical history and problem list.     History reviewed. No pertinent past medical history. Past Surgical History:   Procedure Laterality Date    EYE SURGERY         Family History   Problem Relation Age of Onset    Heart disease Mother     Cancer Mother     Colon cancer Paternal Grandfather     Diabetes Family          Medications have been verified. Objective   /66   Pulse 65   Temp 97.7 °F (36.5 °C)   Resp 18   SpO2 99%        Physical Exam     Physical Exam  Vitals and nursing note reviewed. Constitutional:       General: He is not in acute distress. Appearance: Normal appearance. HENT:      Head: Normocephalic. Cardiovascular:      Rate and Rhythm: Normal rate. Pulses: Normal pulses.    Pulmonary:      Effort: Pulmonary effort is normal.   Musculoskeletal:      Comments: No obvious deformity of right index finger, no bruising, no swelling, no abrasions, no discernible areas of tenderness upon palpation of right index finger, normal strength of right index finger, slight discomfort through complete flexion and extension of right index finger, less than 2-second capillary refill, gross sensation intact, full ROM of all other fingers of right hand with no discomfort   Skin:     General: Skin is warm. Capillary Refill: Capillary refill takes less than 2 seconds. Neurological:      Mental Status: He is alert.

## 2023-10-30 ENCOUNTER — APPOINTMENT (OUTPATIENT)
Dept: PHYSICAL THERAPY | Facility: CLINIC | Age: 68
End: 2023-10-30
Payer: MEDICARE

## 2023-11-13 ENCOUNTER — RA CDI HCC (OUTPATIENT)
Dept: OTHER | Facility: HOSPITAL | Age: 68
End: 2023-11-13

## 2023-11-20 ENCOUNTER — OFFICE VISIT (OUTPATIENT)
Dept: INTERNAL MEDICINE CLINIC | Facility: CLINIC | Age: 68
End: 2023-11-20
Payer: MEDICARE

## 2023-11-20 VITALS
WEIGHT: 163 LBS | BODY MASS INDEX: 21.6 KG/M2 | OXYGEN SATURATION: 99 % | HEIGHT: 73 IN | HEART RATE: 86 BPM | SYSTOLIC BLOOD PRESSURE: 130 MMHG | TEMPERATURE: 96.4 F | DIASTOLIC BLOOD PRESSURE: 80 MMHG

## 2023-11-20 DIAGNOSIS — Z00.00 MEDICARE ANNUAL WELLNESS VISIT, INITIAL: ICD-10-CM

## 2023-11-20 DIAGNOSIS — Z91.038 HYMENOPTERA ALLERGY: ICD-10-CM

## 2023-11-20 DIAGNOSIS — Z23 ENCOUNTER FOR IMMUNIZATION: Primary | ICD-10-CM

## 2023-11-20 PROCEDURE — 99213 OFFICE O/P EST LOW 20 MIN: CPT | Performed by: INTERNAL MEDICINE

## 2023-11-20 PROCEDURE — 90677 PCV20 VACCINE IM: CPT

## 2023-11-20 PROCEDURE — G0439 PPPS, SUBSEQ VISIT: HCPCS | Performed by: INTERNAL MEDICINE

## 2023-11-20 PROCEDURE — G0009 ADMIN PNEUMOCOCCAL VACCINE: HCPCS

## 2023-11-20 NOTE — PROGRESS NOTES
Assessment and Plan:     Problem List Items Addressed This Visit     Hymenoptera allergy   Other Visit Diagnoses     Encounter for immunization    -  Primary    Relevant Orders    Pneumococcal Conjugate Vaccine 20-valent (PCV20) (Completed)    Medicare annual wellness visit, initial              Depression Screening and Follow-up Plan: Patient was screened for depression during today's encounter. They screened negative with a PHQ-2 score of 0. Preventive health issues were discussed with patient, and age appropriate screening tests were ordered as noted in patient's After Visit Summary. Personalized health advice and appropriate referrals for health education or preventive services given if needed, as noted in patient's After Visit Summary. History of Present Illness:     Patient presents for a Medicare Wellness Visit    Reviewed and discussed labs and the patient has not gotten the Epipen yet. Patient Care Team:  Alley Gordon DO as PCP - General (Internal Medicine)     Review of Systems:     Review of Systems   Constitutional:  Negative for chills, fatigue and fever. HENT: Negative. Respiratory:  Negative for cough, chest tightness and shortness of breath. Cardiovascular:  Negative for chest pain and palpitations. Gastrointestinal:  Negative for abdominal pain, constipation, diarrhea, nausea and vomiting. Genitourinary: Negative. Musculoskeletal:  Positive for arthralgias. Negative for back pain and myalgias. Skin: Negative. Neurological: Negative. Psychiatric/Behavioral:  Negative for dysphoric mood. The patient is not nervous/anxious.          Problem List:     Patient Active Problem List   Diagnosis   • Achilles tendinitis of left lower extremity   • Pain of left heel   • Plantar fasciitis of left foot   • Equinus contracture of left ankle   • Pressure ulcer of toe of right foot, stage 2 ([address])   • Hymenoptera allergy      Past Medical and Surgical History:     History reviewed. No pertinent past medical history. Past Surgical History:   Procedure Laterality Date   • EYE SURGERY        Family History:     Family History   Problem Relation Age of Onset   • Heart disease Mother    • Cancer Mother    • Colon cancer Paternal Grandfather    • Diabetes Family       Social History:     Social History     Socioeconomic History   • Marital status: /Civil Union     Spouse name: None   • Number of children: None   • Years of education: None   • Highest education level: None   Occupational History   • None   Tobacco Use   • Smoking status: Never   • Smokeless tobacco: Never   Vaping Use   • Vaping Use: Never used   Substance and Sexual Activity   • Alcohol use: Never   • Drug use: Never   • Sexual activity: Not Currently   Other Topics Concern   • None   Social History Narrative    Denied: Alcohol use (history) - As per [employer]    Caffeine use    Uses safety equipment: Seatbelts      Social Determinants of Health     Financial Resource Strain: Low Risk  (11/20/2023)    Overall Financial Resource Strain (CARDIA)    • Difficulty of Paying Living Expenses: Not hard at all   Food Insecurity: Not on file   Transportation Needs: No Transportation Needs (11/20/2023)    PRAPARE - Transportation    • Lack of Transportation (Medical): No    • Lack of Transportation (Non-Medical): No   Physical Activity: Not on file   Stress: Not on file   Social Connections: Not on file   Intimate Partner Violence: Not on file   Housing Stability: Not on file      Medications and Allergies:     Current Outpatient Medications   Medication Sig Dispense Refill   • EPINEPHrine (EPIPEN) 0.3 mg/0.3 mL SOAJ Inject 0.3 mL (0.3 mg total) into a muscle once for 1 dose 0.6 mL 0     No current facility-administered medications for this visit. Allergies   Allergen Reactions   • Penicillins Rash     Category:  Allergy;       Immunizations:     Immunization History   Administered Date(s) Administered   • PMYYV-27 MODERNA VACC 0.5 ML IM 04/23/2021, 05/21/2021   • Influenza, seasonal, injectable 01/14/2013, 12/10/2013   • Pneumococcal Conjugate Vaccine 20-valent (Pcv20), Polysace 11/20/2023      Health Maintenance:         Topic Date Due   • Colorectal Cancer Screening  Never done   • Hepatitis C Screening  Completed         Topic Date Due   • COVID-19 Vaccine (3 - Moderna series) 07/16/2021   • Influenza Vaccine (1) 09/01/2023      Medicare Screening Tests and Risk Assessments:     Alecia Daly is here for his Subsequent Wellness visit. Last Medicare Wellness visit information reviewed, patient interviewed and updates made to the record today. Health Risk Assessment:   Patient rates overall health as good. Patient feels that their physical health rating is same. Patient is satisfied with their life. Eyesight was rated as same. Hearing was rated as slightly worse. Patient feels that their emotional and mental health rating is same. Patients states they are never, rarely angry. Patient states they are never, rarely unusually tired/fatigued. Pain experienced in the last 7 days has been none. Patient states that he has experienced no weight loss or gain in last 6 months. Depression Screening:   PHQ-2 Score: 0      Fall Risk Screening: In the past year, patient has experienced: history of falling in past year    Number of falls: 2 or more  Injured during fall?: No    Feels unsteady when standing or walking?: No    Worried about falling?: No      Home Safety:  Patient does not have trouble with stairs inside or outside of their home. Patient has working smoke alarms and has working carbon monoxide detector. Home safety hazards include: none. Nutrition:   Current diet is Regular. Medications:   Patient is currently taking over-the-counter supplements. OTC medications include: see medication list. Patient is able to manage medications.      Activities of Daily Living (ADLs)/Instrumental Activities of Daily Living (IADLs): Walk and transfer into and out of bed and chair?: Yes  Dress and groom yourself?: Yes    Bathe or shower yourself?: Yes    Feed yourself? Yes  Do your laundry/housekeeping?: Yes  Manage your money, pay your bills and track your expenses?: Yes  Make your own meals?: Yes    Do your own shopping?: Yes    Previous Hospitalizations:   Any hospitalizations or ED visits within the last 12 months?: No      Advance Care Planning:   Living will: No    Durable POA for healthcare: No    Advanced directive: No    Advanced directive counseling given: Yes    ACP document given: Yes    Patient declined ACP directive: No    End of Life Decisions reviewed with patient: Yes    Provider agrees with end of life decisions: Yes      Cognitive Screening:   Provider or family/friend/caregiver concerned regarding cognition?: No    PREVENTIVE SCREENINGS      Cardiovascular Screening:    General: Screening Current      Diabetes Screening:     General: Screening Current      Colorectal Cancer Screening:       Due for: Cologuard      Prostate Cancer Screening:    General: Screening Current      Osteoporosis Screening:    General: Screening Not Indicated      Abdominal Aortic Aneurysm (AAA) Screening:    Risk factors include: age between 70-75 yo        General: Patient Declines      Lung Cancer Screening:     General: Screening Not Indicated      Hepatitis C Screening:    General: Screening Current    Screening, Brief Intervention, and Referral to Treatment (SBIRT)    Screening      Single Item Drug Screening:  How often have you used an illegal drug (including marijuana) or a prescription medication for non-medical reasons in the past year? never    Single Item Drug Screen Score: 0  Interpretation: Negative screen for possible drug use disorder    No results found.      Physical Exam:     /80   Pulse 86   Temp (!) 96.4 °F (35.8 °C)   Ht 6' 1" (1.854 m)   Wt 73.9 kg (163 lb)   SpO2 99%   BMI 21.51 kg/m²     Physical Exam  Vitals and nursing note reviewed. Constitutional:       General: He is not in acute distress. Appearance: He is well-developed. HENT:      Head: Normocephalic and atraumatic. Eyes:      Conjunctiva/sclera: Conjunctivae normal.   Cardiovascular:      Rate and Rhythm: Normal rate and regular rhythm. Heart sounds: No murmur heard. Pulmonary:      Effort: Pulmonary effort is normal. No respiratory distress. Breath sounds: Normal breath sounds. Abdominal:      Palpations: Abdomen is soft. Tenderness: There is no abdominal tenderness. Musculoskeletal:         General: No swelling. Cervical back: Neck supple. Skin:     General: Skin is warm and dry. Capillary Refill: Capillary refill takes less than 2 seconds. Neurological:      Mental Status: He is alert.    Psychiatric:         Mood and Affect: Mood normal.          Wan Michele, DO

## 2023-11-20 NOTE — PATIENT INSTRUCTIONS
Medicare Preventive Visit Patient Instructions  Thank you for completing your Welcome to Medicare Visit or Medicare Annual Wellness Visit today. Your next wellness visit will be due in one year (11/20/2024). The screening/preventive services that you may require over the next 5-10 years are detailed below. Some tests may not apply to you based off risk factors and/or age. Screening tests ordered at today's visit but not completed yet may show as past due. Also, please note that scanned in results may not display below. Preventive Screenings:  Service Recommendations Previous Testing/Comments   Colorectal Cancer Screening  Colonoscopy    Fecal Occult Blood Test (FOBT)/Fecal Immunochemical Test (FIT)  Fecal DNA/Cologuard Test  Flexible Sigmoidoscopy Age: 43-73 years old   Colonoscopy: every 10 years (May be performed more frequently if at higher risk)  OR  FOBT/FIT: every 1 year  OR  Cologuard: every 3 years  OR  Sigmoidoscopy: every 5 years  Screening may be recommended earlier than age 39 if at higher risk for colorectal cancer. Also, an individualized decision between you and your healthcare provider will decide whether screening between the ages of 77-80 would be appropriate.  Colonoscopy: Not on file  FOBT/FIT: Not on file  Cologuard: Not on file  Sigmoidoscopy: Not on file          Prostate Cancer Screening Individualized decision between patient and health care provider in men between ages of 53-66   Medicare will cover every 12 months beginning on the day after your 50th birthday PSA: 1.11 ng/mL     Screening Current     Hepatitis C Screening Once for adults born between 1945 and 1965  More frequently in patients at high risk for Hepatitis C Hep C Antibody: 10/20/2023    Screening Current   Diabetes Screening 1-2 times per year if you're at risk for diabetes or have pre-diabetes Fasting glucose: 88 mg/dL (10/20/2023)  A1C: No results in last 5 years (No results in last 5 years)  Screening Current Cholesterol Screening Once every 5 years if you don't have a lipid disorder. May order more often based on risk factors. Lipid panel: 10/20/2023  Screening Current      Other Preventive Screenings Covered by Medicare:  Abdominal Aortic Aneurysm (AAA) Screening: covered once if your at risk. You're considered to be at risk if you have a family history of AAA or a male between the age of 70-76 who smoking at least 100 cigarettes in your lifetime. Lung Cancer Screening: covers low dose CT scan once per year if you meet all of the following conditions: (1) Age 48-67; (2) No signs or symptoms of lung cancer; (3) Current smoker or have quit smoking within the last 15 years; (4) You have a tobacco smoking history of at least 20 pack years (packs per day x number of years you smoked); (5) You get a written order from a healthcare provider. Glaucoma Screening: covered annually if you're considered high risk: (1) You have diabetes OR (2) Family history of glaucoma OR (3)  aged 48 and older OR (3)  American aged 72 and older  Osteoporosis Screening: covered every 2 years if you meet one of the following conditions: (1) Have a vertebral abnormality; (2) On glucocorticoid therapy for more than 3 months; (3) Have primary hyperparathyroidism; (4) On osteoporosis medications and need to assess response to drug therapy. HIV Screening: covered annually if you're between the age of 14-79. Also covered annually if you are younger than 13 and older than 72 with risk factors for HIV infection. For pregnant patients, it is covered up to 3 times per pregnancy.     Immunizations:  Immunization Recommendations   Influenza Vaccine Annual influenza vaccination during flu season is recommended for all persons aged >= 6 months who do not have contraindications   Pneumococcal Vaccine   * Pneumococcal conjugate vaccine = PCV13 (Prevnar 13), PCV15 (Vaxneuvance), PCV20 (Prevnar 20)  * Pneumococcal polysaccharide vaccine = PPSV23 (Pneumovax) Adults 25-37 yo with certain risk factors or if 69+ yo  If never received any pneumonia vaccine: recommend Prevnar 20 (PCV20)  Give PCV20 if previously received 1 dose of PCV13 or PPSV23   Hepatitis B Vaccine 3 dose series if at intermediate or high risk (ex: diabetes, end stage renal disease, liver disease)   Respiratory syncytial virus (RSV) Vaccine - COVERED BY MEDICARE PART D  * RSVPreF3 (Arexvy) CDC recommends that adults 61years of age and older may receive a single dose of RSV vaccine using shared clinical decision-making (SCDM)   Tetanus (Td) Vaccine - COST NOT COVERED BY MEDICARE PART B Following completion of primary series, a booster dose should be given every 10 years to maintain immunity against tetanus. Td may also be given as tetanus wound prophylaxis. Tdap Vaccine - COST NOT COVERED BY MEDICARE PART B Recommended at least once for all adults. For pregnant patients, recommended with each pregnancy. Shingles Vaccine (Shingrix) - COST NOT COVERED BY MEDICARE PART B  2 shot series recommended in those 19 years and older who have or will have weakened immune systems or those 50 years and older     Health Maintenance Due:      Topic Date Due   • Colorectal Cancer Screening  Never done   • Hepatitis C Screening  Completed     Immunizations Due:      Topic Date Due   • Pneumococcal Vaccine: 65+ Years (1 - PCV) Never done   • COVID-19 Vaccine (3 - Moderna series) 07/16/2021   • Influenza Vaccine (1) 09/01/2023     Advance Directives   What are advance directives? Advance directives are legal documents that state your wishes and plans for medical care. These plans are made ahead of time in case you lose your ability to make decisions for yourself. Advance directives can apply to any medical decision, such as the treatments you want, and if you want to donate organs. What are the types of advance directives?   There are many types of advance directives, and each state has rules about how to use them. You may choose a combination of any of the following:  Living will: This is a written record of the treatment you want. You can also choose which treatments you do not want, which to limit, and which to stop at a certain time. This includes surgery, medicine, IV fluid, and tube feedings. Durable power of  for [facility]): This is a written record that states who you want to make healthcare choices for you when you are unable to make them for yourself. This person, called a proxy, is usually a family member or a friend. You may choose more than 1 proxy. Do not resuscitate (DNR) order:  A DNR order is used in case your heart stops beating or you stop breathing. It is a request not to have certain forms of treatment, such as CPR. A DNR order may be included in other types of advance directives. Medical directive: This covers the care that you want if you are in a coma, near death, or unable to make decisions for yourself. You can list the treatments you want for each condition. Treatment may include pain medicine, surgery, blood transfusions, dialysis, IV or tube feedings, and a ventilator (breathing machine). Values history: This document has questions about your views, beliefs, and how you feel and think about life. This information can help others choose the care that you would choose. Why are advance directives important? An advance directive helps you control your care. Although spoken wishes may be used, it is better to have your wishes written down. Spoken wishes can be misunderstood, or not followed. Treatments may be given even if you do not want them. An advance directive may make it easier for your family to make difficult choices about your care. Fall Prevention    Fall prevention  includes ways to make your home and other areas safer. It also includes ways you can move more carefully to prevent a fall.  Health conditions that cause changes in your blood pressure, vision, or muscle strength and coordination may increase your risk for falls. Medicines may also increase your risk for falls if they make you dizzy, weak, or sleepy. Fall prevention tips:   Stand or sit up slowly. Use assistive devices as directed. Wear shoes that fit well and have soles that . Wear a personal alarm. Stay active. Manage your medical conditions. Home Safety Tips:  Add items to prevent falls in the bathroom. Keep paths clear. Install bright lights in your home. Keep items you use often on shelves within reach. Paint or place reflective tape on the edges of your stairs. © Copyright [employer] 2018 Information is for End User's use only and may not be sold, redistributed or otherwise used for commercial purposes.  All illustrations and images included in CareNotes® are the copyrighted property of [employer] or [address] [place] [address]

## 2023-11-21 LAB — COLOGUARD RESULT REPORTABLE: NEGATIVE

## 2024-09-24 ENCOUNTER — OFFICE VISIT (OUTPATIENT)
Dept: INTERNAL MEDICINE CLINIC | Facility: CLINIC | Age: 69
End: 2024-09-24
Payer: MEDICARE

## 2024-09-24 VITALS
TEMPERATURE: 97.8 F | OXYGEN SATURATION: 98 % | BODY MASS INDEX: 22.02 KG/M2 | DIASTOLIC BLOOD PRESSURE: 80 MMHG | HEART RATE: 76 BPM | HEIGHT: 73 IN | SYSTOLIC BLOOD PRESSURE: 130 MMHG | WEIGHT: 166.13 LBS

## 2024-09-24 DIAGNOSIS — J40 BRONCHITIS: Chronic | ICD-10-CM

## 2024-09-24 DIAGNOSIS — J06.9 ACUTE URI: Primary | ICD-10-CM

## 2024-09-24 DIAGNOSIS — H04.123 DRY EYES, BILATERAL: Chronic | ICD-10-CM

## 2024-09-24 LAB
SARS-COV-2 AG UPPER RESP QL IA: NEGATIVE
VALID CONTROL: NORMAL

## 2024-09-24 PROCEDURE — 99213 OFFICE O/P EST LOW 20 MIN: CPT | Performed by: NURSE PRACTITIONER

## 2024-09-24 PROCEDURE — 87811 SARS-COV-2 COVID19 W/OPTIC: CPT | Performed by: NURSE PRACTITIONER

## 2024-09-24 PROCEDURE — G2211 COMPLEX E/M VISIT ADD ON: HCPCS | Performed by: NURSE PRACTITIONER

## 2024-09-24 RX ORDER — LEVOFLOXACIN 500 MG/1
500 TABLET, FILM COATED ORAL EVERY 24 HOURS
Qty: 7 TABLET | Refills: 0 | Status: SHIPPED | OUTPATIENT
Start: 2024-09-24 | End: 2024-10-01

## 2024-09-24 RX ORDER — BENZONATATE 200 MG/1
200 CAPSULE ORAL 3 TIMES DAILY PRN
Qty: 90 CAPSULE | Refills: 0 | Status: SHIPPED | OUTPATIENT
Start: 2024-09-24

## 2024-09-24 NOTE — PROGRESS NOTES
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   Orders:    POCT Rapid Covid Ag    levofloxacin (LEVAQUIN) 500 mg tablet; Take 1 tablet (500 mg total) by mouth every 24 hours for 7 days    benzonatate (TESSALON) 200 MG capsule; Take 1 capsule (200 mg total) by mouth 3 (three) times a day as needed for cough    Bronchitis    Orders:    levofloxacin (LEVAQUIN) 500 mg tablet; Take 1 tablet (500 mg total) by mouth every 24 hours for 7 days    benzonatate (TESSALON) 200 MG capsule; Take 1 capsule (200 mg total) by mouth 3 (three) times a day as needed for cough    Dry eyes, bilateral  Okay to use visine             History of Present Illness     The patient is here today to discuss his respiratory issues.   The patient voiced their understanding and agreement.   Please continue to the PORCH section of the note for details of today's visit.               History obtained from : patient  Review of Systems   Respiratory:  Positive for cough (occasional) and shortness of breath.      Current Outpatient Medications on File Prior to Visit   Medication Sig Dispense Refill    EPINEPHrine (EPIPEN) 0.3 mg/0.3 mL SOAJ Inject 0.3 mL (0.3 mg total) into a muscle once for 1 dose (Patient not taking: Reported on 2024) 0.6 mL 0     No current facility-administered medications on file prior to visit.          Objective     /80 (BP Location: Left arm,  "Patient Position: Sitting)   Pulse 76   Temp 97.8 °F (36.6 °C) (Tympanic)   Ht 6' 1\" (1.854 m)   Wt 75.4 kg (166 lb 2 oz)   SpO2 98%   BMI 21.92 kg/m²     Physical Exam  Pulmonary:      Comments: Bronchitis breath sounds   Occasional shortness of breath with occasional cough       Administrative Statements   I have spent a total time of 25 minutes in caring for this patient on the day of the visit/encounter including Diagnostic results, Prognosis, Risks and benefits of tx options, Instructions for management, Patient and family education, Importance of tx compliance, Risk factor reductions, Impressions, Counseling / Coordination of care, Documenting in the medical record, Reviewing / ordering tests, medicine, procedures  , and Obtaining or reviewing history  .  "

## 2024-09-24 NOTE — ASSESSMENT & PLAN NOTE
Orders:    levofloxacin (LEVAQUIN) 500 mg tablet; Take 1 tablet (500 mg total) by mouth every 24 hours for 7 days    benzonatate (TESSALON) 200 MG capsule; Take 1 capsule (200 mg total) by mouth 3 (three) times a day as needed for cough

## 2024-09-24 NOTE — ASSESSMENT & PLAN NOTE
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   Orders:    POCT Rapid Covid Ag    levofloxacin (LEVAQUIN) 500 mg tablet; Take 1 tablet (500 mg total) by mouth every 24 hours for 7 days    benzonatate (TESSALON) 200 MG capsule; Take 1 capsule (200 mg total) by mouth 3 (three) times a day as needed for cough

## 2024-10-24 PROBLEM — J06.9 ACUTE URI: Status: RESOLVED | Noted: 2024-09-24 | Resolved: 2024-10-24

## 2024-11-22 ENCOUNTER — OFFICE VISIT (OUTPATIENT)
Dept: INTERNAL MEDICINE CLINIC | Facility: CLINIC | Age: 69
End: 2024-11-22
Payer: MEDICARE

## 2024-11-22 VITALS
BODY MASS INDEX: 21.63 KG/M2 | SYSTOLIC BLOOD PRESSURE: 132 MMHG | OXYGEN SATURATION: 97 % | HEART RATE: 70 BPM | WEIGHT: 163.2 LBS | HEIGHT: 73 IN | TEMPERATURE: 97.9 F | DIASTOLIC BLOOD PRESSURE: 82 MMHG

## 2024-11-22 DIAGNOSIS — Z13.1 SCREENING FOR DIABETES MELLITUS: ICD-10-CM

## 2024-11-22 DIAGNOSIS — Z13.220 SCREENING FOR HYPERCHOLESTEROLEMIA: ICD-10-CM

## 2024-11-22 DIAGNOSIS — Z00.00 MEDICARE ANNUAL WELLNESS VISIT, SUBSEQUENT: ICD-10-CM

## 2024-11-22 DIAGNOSIS — Z12.5 SCREENING PSA (PROSTATE SPECIFIC ANTIGEN): ICD-10-CM

## 2024-11-22 DIAGNOSIS — Z13.0 SCREENING, ANEMIA, DEFICIENCY, IRON: ICD-10-CM

## 2024-11-22 DIAGNOSIS — S30.861A TICK BITE OF ABDOMINAL WALL, INITIAL ENCOUNTER: ICD-10-CM
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
## 2024-11-22 DIAGNOSIS — Z23 ENCOUNTER FOR IMMUNIZATION: Primary | ICD-10-CM

## 2024-11-22 PROBLEM — L89.892 PRESSURE ULCER OF TOE OF RIGHT FOOT, STAGE 2 (HCC): Status: RESOLVED | Noted: 2023-10-18 | Resolved: 2024-11-22

## 2024-11-22 PROCEDURE — 99214 OFFICE O/P EST MOD 30 MIN: CPT | Performed by: INTERNAL MEDICINE

## 2024-11-22 PROCEDURE — G0439 PPPS, SUBSEQ VISIT: HCPCS | Performed by: INTERNAL MEDICINE

## 2024-11-22 NOTE — PROGRESS NOTES
Name: Adarsh Wilder      : 1955      MRN: [id number]  Encounter Provider: Se Daon DO  Encounter Date: 2024   Encounter department: [facility]    Assessment & Plan  Medicare annual wellness visit, subsequent         Tick bite of abdominal wall, initial encounter  Notes he found an engorged tick on the left abdomen.  The patient notes no flu like symptoms or migratory arthralgia.  The tick appears to be an ixodides tick  He is not interested in Doxy, but does want testing  Tick bite within a week and we will have him follow up next week with Lyme test since he is asymptomatic.   Orders:    Lyme Total AB W Reflex to IGM/IGG; Future    Encounter for immunization  Deferred  Orders:    influenza vaccine, high-dose, PF 0.5 mL (Fluzone High Dose)    Screening for hypercholesterolemia  Follow up on his LDL cholesterol  Orders:    Lipid Panel with Direct LDL reflex; Future    Screening for diabetes mellitus  MP ordered to follow up on his fasting glucose  Orders:    Comprehensive metabolic panel; Future    Screening, anemia, deficiency, iron  CBC ordered  Orders:    CBC and differential; Future    Screening PSA (prostate specific antigen)  Check PSA   Orders:    PSA, Total Screen; Future       Preventive health issues were discussed with patient, and age appropriate screening tests were ordered as noted in patient's After Visit Summary. Personalized health advice and appropriate referrals for health education or preventive services given if needed, as noted in patient's After Visit Summary.    History of Present Illness     HPI   Patient Care Team:  Se Doan DO as PCP - General (Internal Medicine)    Review of Systems   Constitutional:  Negative for chills and fever.   HENT:  Negative for ear pain and sore throat.    Eyes:  Negative for pain and visual disturbance.   Respiratory:  Negative for cough and shortness of breath.    Cardiovascular:  Negative for chest pain and  palpitations.   Gastrointestinal:  Negative for abdominal pain and vomiting.   Genitourinary:  Negative for dysuria and hematuria.   Musculoskeletal:  Negative for arthralgias and back pain.   Skin:  Negative for color change and rash.   Neurological:  Negative for seizures and syncope.   All other systems reviewed and are negative.    Medical History Reviewed by provider this encounter:       Annual Wellness Visit Questionnaire   Adarsh is here for his Subsequent Wellness visit.     Health Risk Assessment:   Patient rates overall health as good. Patient feels that their physical health rating is same. Patient is satisfied with their life. Eyesight was rated as same. Hearing was rated as same. Patient feels that their emotional and mental health rating is same. Patients states they are never, rarely angry. Patient states they are often unusually tired/fatigued. Pain experienced in the last 7 days has been some. Patient's pain rating has been 1/10. Patient states that he has experienced no weight loss or gain in last 6 months.     Depression Screening:   PHQ-2 Score: 0      Fall Risk Screening:   In the past year, patient has experienced: no history of falling in past year      Home Safety:  Patient does not have trouble with stairs inside or outside of their home. Patient has working smoke alarms and has working carbon monoxide detector. Home safety hazards include: none.     Nutrition:   Current diet is Regular.     Medications:   Patient is not currently taking any over-the-counter supplements. Patient is able to manage medications.     Activities of Daily Living (ADLs)/Instrumental Activities of Daily Living (IADLs):   Walk and transfer into and out of bed and chair?: Yes  Dress and groom yourself?: Yes    Bathe or shower yourself?: Yes    Feed yourself? Yes  Do your laundry/housekeeping?: Yes  Manage your money, pay your bills and track your expenses?: Yes  Make your own meals?: Yes    Do your own shopping?:  Yes    Previous Hospitalizations:   Any hospitalizations or ED visits within the last 12 months?: No      Advance Care Planning:   Living will: Yes    Durable POA for healthcare: No    Advanced directive: Yes    Advanced directive counseling given: Yes    Five wishes given: No    Patient declined ACP directive: No    End of Life Decisions reviewed with patient: Yes    Provider agrees with end of life decisions: Yes      Cognitive Screening:   Provider or family/friend/caregiver concerned regarding cognition?: No    PREVENTIVE SCREENINGS      Cardiovascular Screening:    General: Screening Current    Due for: Lipid Panel      Diabetes Screening:       Due for: Blood Glucose      Colorectal Cancer Screening:     General: Screening Current      Prostate Cancer Screening:      Due for: PSA      Osteoporosis Screening:    General: Screening Not Indicated      Abdominal Aortic Aneurysm (AAA) Screening:    Risk factors include: age between 65-74 yo        General: Screening Not Indicated      Lung Cancer Screening:     General: Screening Not Indicated      Hepatitis C Screening:    General: Screening Current    Screening, Brief Intervention, and Referral to Treatment (SBIRT)    Screening  Typical number of drinks in a day: 0  Typical number of drinks in a week: 0  Interpretation: Low risk drinking behavior.    Single Item Drug Screening:  How often have you used an illegal drug (including marijuana) or a prescription medication for non-medical reasons in the past year? never    Single Item Drug Screen Score: 0  Interpretation: Negative screen for possible drug use disorder    Social Drivers of Health     Financial Resource Strain: Low Risk  (11/20/2023)    Overall Financial Resource Strain (CARDIA)     Difficulty of Paying Living Expenses: Not hard at all   Food Insecurity: No Food Insecurity (11/22/2024)    Hunger Vital Sign     Worried About Running Out of Food in the Last Year: Never true     Ran Out of Food in the Last  "Year: Never true   Transportation Needs: No Transportation Needs (11/22/2024)    PRAPARE - Transportation     Lack of Transportation (Medical): No     Lack of Transportation (Non-Medical): No   Housing Stability: Low Risk  (11/22/2024)    Housing Stability Vital Sign     Unable to Pay for Housing in the Last Year: No     Number of Times Moved in the Last Year: 0     Homeless in the Last Year: No   Utilities: Not At Risk (11/22/2024)    [facility] Utilities     Threatened with loss of utilities: No     No results found.    Objective   /82   Pulse 70   Temp 97.9 °F (36.6 °C) (Tympanic)   Ht 6' 1\" (1.854 m)   Wt 74 kg (163 lb 3.2 oz)   SpO2 97%   BMI 21.53 kg/m²     Physical Exam  Vitals and nursing note reviewed.   Constitutional:       General: He is not in acute distress.     Appearance: He is well-developed.   HENT:      Head: Normocephalic and atraumatic.   Eyes:      Conjunctiva/sclera: Conjunctivae normal.   Cardiovascular:      Rate and Rhythm: Normal rate and regular rhythm.      Heart sounds: No murmur heard.  Pulmonary:      Effort: Pulmonary effort is normal. No respiratory distress.      Breath sounds: Normal breath sounds.   Abdominal:      Palpations: Abdomen is soft.      Tenderness: There is no abdominal tenderness.   Musculoskeletal:         General: No swelling.      Cervical back: Neck supple.   Skin:     General: Skin is warm and dry.      Capillary Refill: Capillary refill takes less than 2 seconds.   Neurological:      Mental Status: He is alert.   Psychiatric:         Mood and Affect: Mood normal.         "

## 2024-11-25 ENCOUNTER — APPOINTMENT (OUTPATIENT)
Dept: LAB | Facility: CLINIC | Age: 69
End: 2024-11-25
Payer: MEDICARE

## 2024-11-25 DIAGNOSIS — Z13.1 SCREENING FOR DIABETES MELLITUS: ICD-10-CM

## 2024-11-25 DIAGNOSIS — Z13.220 SCREENING FOR HYPERCHOLESTEROLEMIA: ICD-10-CM

## 2024-11-25 DIAGNOSIS — Z12.5 SCREENING PSA (PROSTATE SPECIFIC ANTIGEN): ICD-10-CM
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
## 2024-11-25 DIAGNOSIS — S30.861A TICK BITE OF ABDOMINAL WALL, INITIAL ENCOUNTER: ICD-10-CM

## 2024-11-25 DIAGNOSIS — Z13.0 SCREENING, ANEMIA, DEFICIENCY, IRON: ICD-10-CM

## 2024-11-25 LAB
ALBUMIN SERPL BCG-MCNC: 4.3 G/DL (ref 3.5–5)
ALP SERPL-CCNC: 47 U/L (ref 34–104)
ALT SERPL W P-5'-P-CCNC: 22 U/L (ref 7–52)
ANION GAP SERPL CALCULATED.3IONS-SCNC: 5 MMOL/L (ref 4–13)
AST SERPL W P-5'-P-CCNC: 22 U/L (ref 13–39)
BASOPHILS # BLD AUTO: 0.08 THOUSANDS/ΜL (ref 0–0.1)
BASOPHILS NFR BLD AUTO: 1 % (ref 0–1)
BILIRUB SERPL-MCNC: 0.91 MG/DL (ref 0.2–1)
BUN SERPL-MCNC: 19 MG/DL (ref 5–25)
CALCIUM SERPL-MCNC: 9.1 MG/DL (ref 8.4–10.2)
CHLORIDE SERPL-SCNC: 104 MMOL/L (ref 96–108)
CHOLEST SERPL-MCNC: 197 MG/DL (ref ?–200)
CO2 SERPL-SCNC: 29 MMOL/L (ref 21–32)
CREAT SERPL-MCNC: 0.97 MG/DL (ref 0.6–1.3)
EOSINOPHIL # BLD AUTO: 0.18 THOUSAND/ΜL (ref 0–0.61)
EOSINOPHIL NFR BLD AUTO: 3 % (ref 0–6)
ERYTHROCYTE [DISTWIDTH] IN BLOOD BY AUTOMATED COUNT: 12.9 % (ref 11.6–15.1)
GFR SERPL CREATININE-BSD FRML MDRD: 79 ML/MIN/1.73SQ M
GLUCOSE P FAST SERPL-MCNC: 87 MG/DL (ref 65–99)
HCT VFR BLD AUTO: 48.3 % (ref 36.5–49.3)
HDLC SERPL-MCNC: 75 MG/DL
HGB BLD-MCNC: 15.9 G/DL (ref 12–17)
IMM GRANULOCYTES # BLD AUTO: 0.02 THOUSAND/UL (ref 0–0.2)
IMM GRANULOCYTES NFR BLD AUTO: 0 % (ref 0–2)
LDLC SERPL CALC-MCNC: 109 MG/DL (ref 0–100)
LYMPHOCYTES # BLD AUTO: 1.98 THOUSANDS/ΜL (ref 0.6–4.47)
LYMPHOCYTES NFR BLD AUTO: 28 % (ref 14–44)
MCH RBC QN AUTO: 30.1 PG (ref 26.8–34.3)
MCHC RBC AUTO-ENTMCNC: 32.9 G/DL (ref 31.4–37.4)
MCV RBC AUTO: 91 FL (ref 82–98)
MONOCYTES # BLD AUTO: 0.74 THOUSAND/ΜL (ref 0.17–1.22)
MONOCYTES NFR BLD AUTO: 11 % (ref 4–12)
NEUTROPHILS # BLD AUTO: 4.01 THOUSANDS/ΜL (ref 1.85–7.62)
NEUTS SEG NFR BLD AUTO: 57 % (ref 43–75)
NRBC BLD AUTO-RTO: 0 /100 WBCS
PLATELET # BLD AUTO: 195 THOUSANDS/UL (ref 149–390)
PMV BLD AUTO: 12.2 FL (ref 8.9–12.7)
POTASSIUM SERPL-SCNC: 4.7 MMOL/L (ref 3.5–5.3)
PROT SERPL-MCNC: 7.1 G/DL (ref 6.4–8.4)
PSA SERPL-MCNC: 1.05 NG/ML (ref 0–4)
RBC # BLD AUTO: 5.29 MILLION/UL (ref 3.88–5.62)
SODIUM SERPL-SCNC: 138 MMOL/L (ref 135–147)
TRIGL SERPL-MCNC: 66 MG/DL (ref ?–150)
WBC # BLD AUTO: 7.01 THOUSAND/UL (ref 4.31–10.16)

## 2024-11-25 PROCEDURE — 80053 COMPREHEN METABOLIC PANEL: CPT

## 2024-11-25 PROCEDURE — 85025 COMPLETE CBC W/AUTO DIFF WBC: CPT

## 2024-11-25 PROCEDURE — 80061 LIPID PANEL: CPT

## 2024-11-25 PROCEDURE — G0103 PSA SCREENING: HCPCS

## 2024-11-25 PROCEDURE — 36415 COLL VENOUS BLD VENIPUNCTURE: CPT

## 2024-11-29 ENCOUNTER — RESULTS FOLLOW-UP (OUTPATIENT)
Dept: INTERNAL MEDICINE CLINIC | Facility: CLINIC | Age: 69
End: 2024-11-29

## 2024-12-19 ENCOUNTER — APPOINTMENT (OUTPATIENT)
Dept: LAB | Facility: CLINIC | Age: 69
End: 2024-12-19
Payer: MEDICARE

## 2024-12-19 PROCEDURE — 86618 LYME DISEASE ANTIBODY: CPT

## 2024-12-20 LAB — B BURGDOR IGG+IGM SER QL IA: NEGATIVE

## 2025-08-01 ENCOUNTER — TELEPHONE (OUTPATIENT)
Age: 70
End: 2025-08-01

## 2025-08-04 ENCOUNTER — OFFICE VISIT (OUTPATIENT)
Dept: OBGYN CLINIC | Facility: CLINIC | Age: 70
End: 2025-08-04
Payer: MEDICARE

## 2025-08-04 VITALS — WEIGHT: 166 LBS | BODY MASS INDEX: 22 KG/M2 | HEIGHT: 73 IN

## 2025-08-04 DIAGNOSIS — S83.412A SPRAIN OF MEDIAL COLLATERAL LIGAMENT OF LEFT KNEE, INITIAL ENCOUNTER: Primary | ICD-10-CM

## 2025-08-04 DIAGNOSIS — Z01.89 ENCOUNTER FOR LOWER EXTREMITY COMPARISON IMAGING STUDY: ICD-10-CM

## 2025-08-04 PROCEDURE — 99204 OFFICE O/P NEW MOD 45 MIN: CPT | Performed by: STUDENT IN AN ORGANIZED HEALTH CARE EDUCATION/TRAINING PROGRAM
